# Patient Record
Sex: FEMALE | Race: WHITE | NOT HISPANIC OR LATINO | Employment: UNEMPLOYED | URBAN - METROPOLITAN AREA
[De-identification: names, ages, dates, MRNs, and addresses within clinical notes are randomized per-mention and may not be internally consistent; named-entity substitution may affect disease eponyms.]

---

## 2017-08-14 ENCOUNTER — OFFICE VISIT (OUTPATIENT)
Dept: OBSTETRICS AND GYNECOLOGY | Facility: CLINIC | Age: 52
End: 2017-08-14
Payer: MEDICAID

## 2017-08-14 ENCOUNTER — TELEPHONE (OUTPATIENT)
Dept: OBSTETRICS AND GYNECOLOGY | Facility: CLINIC | Age: 52
End: 2017-08-14

## 2017-08-14 VITALS
HEART RATE: 82 BPM | DIASTOLIC BLOOD PRESSURE: 90 MMHG | WEIGHT: 208 LBS | RESPIRATION RATE: 18 BRPM | SYSTOLIC BLOOD PRESSURE: 180 MMHG | HEIGHT: 62 IN | BODY MASS INDEX: 38.28 KG/M2

## 2017-08-14 DIAGNOSIS — Z01.419 WELL WOMAN EXAM WITH ROUTINE GYNECOLOGICAL EXAM: Primary | ICD-10-CM

## 2017-08-14 DIAGNOSIS — N95.1 MENOPAUSAL SYMPTOMS: ICD-10-CM

## 2017-08-14 DIAGNOSIS — K60.2 RECTAL FISSURE: ICD-10-CM

## 2017-08-14 DIAGNOSIS — Z12.4 CERVICAL CANCER SCREENING: ICD-10-CM

## 2017-08-14 PROCEDURE — 99213 OFFICE O/P EST LOW 20 MIN: CPT | Mod: PBBFAC | Performed by: OBSTETRICS & GYNECOLOGY

## 2017-08-14 PROCEDURE — 99386 PREV VISIT NEW AGE 40-64: CPT | Mod: S$PBB,,, | Performed by: OBSTETRICS & GYNECOLOGY

## 2017-08-14 PROCEDURE — 99999 PR PBB SHADOW E&M-EST. PATIENT-LVL III: CPT | Mod: PBBFAC,,, | Performed by: OBSTETRICS & GYNECOLOGY

## 2017-08-14 RX ORDER — HYDROCORTISONE ACETATE 25 MG/1
25 SUPPOSITORY RECTAL 2 TIMES DAILY PRN
Qty: 14 SUPPOSITORY | Refills: 1 | Status: SHIPPED | OUTPATIENT
Start: 2017-08-14 | End: 2017-08-21

## 2017-08-14 RX ORDER — ACETAMINOPHEN AND CODEINE PHOSPHATE 300; 30 MG/1; MG/1
1 TABLET ORAL EVERY 4 HOURS PRN
Qty: 20 TABLET | Refills: 0 | Status: SHIPPED | OUTPATIENT
Start: 2017-08-14 | End: 2019-01-07

## 2017-08-14 RX ORDER — CLONAZEPAM 0.5 MG/1
0.5 TABLET ORAL DAILY PRN
Refills: 1 | COMMUNITY
Start: 2017-07-11 | End: 2019-11-18

## 2017-08-14 NOTE — TELEPHONE ENCOUNTER
Pt scheduled with Dr. Lawrence 8/23/17 3:15pm. Referral faxed to [phone], confirmation received.

## 2017-08-14 NOTE — PROGRESS NOTES
Subjective:    Patient ID: Yaneth Tejada is a 52 y.o. female.     Chief Complaint: Annual Well Woman Exam     History of Present Illness:  Yaneth presents today for Annual Well Woman exam. .No LMP recorded. Patient has had a hysterectomy.. She is currently using Oral Estrogen and she reports no problems with hot flashes, night sweats, irritability and vaginal dryness. She denies breast tenderness, masses, nipple discharge.  She reports no problems with urination. Bowel movements have been painful recently with spotting blood..    Menstrual History:   No LMP recorded. Patient has had a hysterectomy.    OB History      Para Term  AB Living    2 2 2          SAB TAB Ectopic Multiple Live Births                       Review of Systems   Constitutional: Negative for activity change, appetite change, chills, diaphoresis, fatigue, fever and unexpected weight change.   HENT: Negative for mouth sores and tinnitus.    Eyes: Negative for discharge and visual disturbance.   Respiratory: Negative for cough, shortness of breath and wheezing.    Cardiovascular: Negative for chest pain, palpitations and leg swelling.   Gastrointestinal: Positive for blood in stool. Negative for abdominal pain, constipation, diarrhea, nausea and vomiting.   Endocrine: Positive for hot flashes. Negative for diabetes, hair loss, hyperthyroidism and hypothyroidism.   Genitourinary: Negative for decreased libido, dyspareunia, dysuria, flank pain, frequency, genital sores, hematuria, menorrhagia, menstrual problem, pelvic pain, urgency, vaginal bleeding, vaginal discharge, vaginal pain, urinary incontinence, postcoital bleeding and vaginal odor.   Musculoskeletal: Negative for back pain, joint swelling and myalgias.   Skin:  Negative for rash, no acne and hair changes.   Neurological: Negative for seizures, syncope, numbness and headaches.   Hematological: Negative for adenopathy. Does not bruise/bleed easily.   Psychiatric/Behavioral: Negative  for sleep disturbance. The patient is not nervous/anxious.    Breast: Negative for breast pain and nipple discharge        Objective:    Vital Signs:  Vitals:    08/14/17 1339   BP: (!) 180/90   Pulse: 82   Resp: 18       Physical Exam:  General:  alert,normal appearing gravid female   Skin:  Skin color, texture, turgor normal. No rashes or lesions   HEENT:  conjunctivae/corneas clear. PERRL.   Neck: supple, trachea midline, no adenopathy or thyromegally   Respiratory:  clear to auscultation bilaterally   Heart:  regular rate and rhythm, S1, S2 normal, no murmur, click, rub or gallop   Breasts:   Nipples are protruding and have no nipple discharge. No palpable masses, erythema, skin changes, tenderness, or adenopathy.   Abdomen:  soft, non-tender. Bowel sounds normal. No masses,  no organomegaly   Pelvis: External genitalia: normal general appearance  Urinary system: urethral meatus normal, bladder nontender  Vaginal: normal mucosa without prolapse or lesions  Cervix: removed surgically  Uterus: removed surgically  Adnexa: removed surgically   Extremities: Normal ROM; no edema, no cyanosis   Neurologial: Normal strength and tone. No focal numbness or weakness. Reflexes 2+ and equal.   Psychiatric: normal mood, speech, dress, and thought processes         Assessment:      1. Well woman exam with routine gynecological exam    2. Menopausal symptoms    3. Rectal fissure    4. Cervical cancer screening          Plan:      Well woman exam with routine gynecological exam    Menopausal symptoms  -     estrogens, conjugated, (PREMARIN) 0.9 MG Tab; Take 1 tablet (0.9 mg total) by mouth once daily.  Dispense: 30 tablet; Refill: 11    Rectal fissure  -     hydrocortisone (ANUSOL-HC) 25 mg suppository; Place 1 suppository (25 mg total) rectally 2 (two) times daily as needed for Hemorrhoids.  Dispense: 14 suppository; Refill: 1  -     acetaminophen-codeine 300-30mg (TYLENOL-CODEINE #3) 300-30 mg Tab; Take 1 tablet by mouth every  4 (four) hours as needed.  Dispense: 20 tablet; Refill: 0  -     Ambulatory referral to General Surgery    Cervical cancer screening  -     Liquid-based pap smear, diagnostic        COUNSELING:  Yaneth was counseled on A.C.O.G. Pap guidelines and recommendations for yearly pelvic exams in addition to recommendations for yearly mammograms and monthly self breast exams. In addition she was counseled on adequate intake of calcium and vitamin D; to see her PCP for other health maintenance.

## 2017-08-15 ENCOUNTER — TELEPHONE (OUTPATIENT)
Dept: OBSTETRICS AND GYNECOLOGY | Facility: CLINIC | Age: 52
End: 2017-08-15

## 2017-08-15 RX ORDER — HYDROCORTISONE 25 MG/G
CREAM TOPICAL 2 TIMES DAILY
Qty: 30 G | Refills: 2 | Status: SHIPPED | OUTPATIENT
Start: 2017-08-15 | End: 2017-08-15

## 2017-08-15 NOTE — TELEPHONE ENCOUNTER
Inform patient that Proctosol Rx sent to [facility] pharmacy. Use of cream with pain pill will help better that pain pill alone. If pain not better, she might check with [facility] ED to see if that could get her quicker access to Dr. Wiseman. I she is unable to see him until 8/23, I can possibly arrange her to see someone else before that. Let me know.

## 2017-08-15 NOTE — TELEPHONE ENCOUNTER
----- Message from Aaron Crump MA sent at 8/15/2017  9:20 AM CDT -----  Contact: Patient  Has question regarding Rx that was prescribed yesterday. States she is in a lot of pain and the Rx in not working and making her feel ill. Please return call @ [phone]    Pharmacy: [facility]

## 2017-08-15 NOTE — TELEPHONE ENCOUNTER
----- Message from Aaron Crump MA sent at 8/15/2017  9:20 AM CDT -----  Contact: Patient  Has question regarding Rx that was prescribed yesterday. States she is in a lot of pain and the Rx in not working and making her feel ill. Please return call @ [phone]    Pharmacy: [facility]

## 2017-08-15 NOTE — TELEPHONE ENCOUNTER
Patient stated tylenol #3 is not helping with the pain. She would like something else called in for pain. Her appointment with Dr Wiseman is 8/23. Anusol-hc suppository's is not covered by her insurance. Proctosol HC cream 2.5% is covered or Hemorrhoidal 51% suppository. Please send to pharm.

## 2017-08-18 ENCOUNTER — TELEPHONE (OUTPATIENT)
Dept: OBSTETRICS AND GYNECOLOGY | Facility: CLINIC | Age: 52
End: 2017-08-18

## 2017-08-22 ENCOUNTER — TELEPHONE (OUTPATIENT)
Dept: OBSTETRICS AND GYNECOLOGY | Facility: CLINIC | Age: 52
End: 2017-08-22

## 2017-08-22 NOTE — TELEPHONE ENCOUNTER
----- Message from Jenise Meraz sent at 8/22/2017 11:02 AM CDT -----  Contact: juancarlos Tejada  MRN: [id number]  Home Phone      [phone]  Work Phone      Not on file.  Mobile          [phone]    Patient Care Team:  Cash Dempsey MD as PCP - General (Family Medicine)  Carrington Us MD as Obstetrician (Obstetrics)  OB? No  What phone number can you be reached at? [phone]  Message: insurance is not covering the script that Dr Us is calling in. Patient stated it is $200.00/ she said she called yesterday and they just keep calling in the exact same script. Patient is requesting something cheaper.

## 2017-08-22 NOTE — TELEPHONE ENCOUNTER
Patient states copay for Anusol is $200. States the will discuss treatment with Dr. Wiseman tomorrow for evaluation.

## 2017-08-24 ENCOUNTER — TELEPHONE (OUTPATIENT)
Dept: OBSTETRICS AND GYNECOLOGY | Facility: CLINIC | Age: 52
End: 2017-08-24

## 2017-08-24 NOTE — TELEPHONE ENCOUNTER
Pt contact office to inform MD she is scheduled for surgery with Dr. Wiseman 8/28/17. Will contact office if anything further needed.

## 2017-08-24 NOTE — TELEPHONE ENCOUNTER
----- Message from Wendy Carlin MA sent at 8/24/2017  2:56 PM CDT -----  Contact: SID Tejada  MRN: [id number]  Home Phone      [phone]  Work Phone      Not on file.  Mobile          [phone]    Patient Care Team:  Cash Dempsey MD as PCP - General (Family Medicine)  Carrington Us MD as Obstetrician (Obstetrics)  OB? No  What phone number can you be reached at? [phone]  Message:   Would like Dr Us know that she is having surgery on Monday with Dr Wiseman.

## 2017-08-24 NOTE — TELEPHONE ENCOUNTER
----- Message from Jenise Meraz sent at 8/24/2017  1:41 PM CDT -----  Contact: juancarlos Tejada  MRN: [id number]  Home Phone      [phone]  Work Phone      Not on file.  Mobile          [phone]    Patient Care Team:  Cash Dempsey MD as PCP - General (Family Medicine)  Carrington Us MD as Obstetrician (Obstetrics)  OB? No  What phone number can you be reached at? [phone]  Message: pt wanted to let Dr Us know she is having surgery Monday @ 7am

## 2018-04-16 ENCOUNTER — TELEPHONE (OUTPATIENT)
Dept: OBSTETRICS AND GYNECOLOGY | Facility: CLINIC | Age: 53
End: 2018-04-16

## 2018-04-16 DIAGNOSIS — Z12.31 ENCOUNTER FOR SCREENING MAMMOGRAM FOR BREAST CANCER: Primary | ICD-10-CM

## 2018-04-16 NOTE — TELEPHONE ENCOUNTER
----- Message from Vangie Kirk sent at 2018  2:45 PM CDT -----  Contact: SID Tejada  MRN: [id number]  : 1965  PCP: Cash Dempsey  Home Phone      [phone]  Work Phone      Not on file.  [employer]          [phone]      MESSAGE: Please place orders for mammogram that is scheduled for 18 at [facility].  Phone:[phone]

## 2018-04-23 ENCOUNTER — HOSPITAL ENCOUNTER (OUTPATIENT)
Dept: RADIOLOGY | Facility: HOSPITAL | Age: 53
Discharge: HOME | End: 2018-04-23
Attending: OBSTETRICS & GYNECOLOGY
Payer: MEDICAID

## 2018-04-23 VITALS — WEIGHT: 208 LBS | BODY MASS INDEX: 38.28 KG/M2 | HEIGHT: 62 IN

## 2018-04-23 DIAGNOSIS — Z12.31 ENCOUNTER FOR SCREENING MAMMOGRAM FOR BREAST CANCER: ICD-10-CM

## 2018-04-23 PROCEDURE — 77063 BREAST TOMOSYNTHESIS BI: CPT | Mod: 26,,, | Performed by: RADIOLOGY

## 2018-04-23 PROCEDURE — 77067 SCR MAMMO BI INCL CAD: CPT | Mod: TC

## 2018-04-23 PROCEDURE — 77067 SCR MAMMO BI INCL CAD: CPT | Mod: 26,,, | Performed by: RADIOLOGY

## 2018-06-15 RX ORDER — CLOTRIMAZOLE AND BETAMETHASONE DIPROPIONATE 10; .64 MG/G; MG/G
CREAM TOPICAL 2 TIMES DAILY
Qty: 15 G | Refills: 1 | Status: SHIPPED | OUTPATIENT
Start: 2018-06-15 | End: 2021-10-04 | Stop reason: SDUPTHER

## 2018-06-15 RX ORDER — TERCONAZOLE 8 MG/G
1 CREAM VAGINAL NIGHTLY
Qty: 20 G | Refills: 1 | Status: SHIPPED | OUTPATIENT
Start: 2018-06-15 | End: 2018-06-18

## 2019-01-07 ENCOUNTER — PROCEDURE VISIT (OUTPATIENT)
Dept: OBSTETRICS AND GYNECOLOGY | Facility: CLINIC | Age: 54
End: 2019-01-07
Payer: MEDICAID

## 2019-01-07 ENCOUNTER — OFFICE VISIT (OUTPATIENT)
Dept: OBSTETRICS AND GYNECOLOGY | Facility: CLINIC | Age: 54
End: 2019-01-07
Payer: MEDICAID

## 2019-01-07 VITALS
HEIGHT: 62 IN | DIASTOLIC BLOOD PRESSURE: 96 MMHG | HEART RATE: 86 BPM | WEIGHT: 230.63 LBS | RESPIRATION RATE: 18 BRPM | BODY MASS INDEX: 42.44 KG/M2 | SYSTOLIC BLOOD PRESSURE: 150 MMHG

## 2019-01-07 DIAGNOSIS — R10.30 LOWER ABDOMINAL PAIN: Primary | ICD-10-CM

## 2019-01-07 DIAGNOSIS — R10.2 PELVIC PAIN IN FEMALE: ICD-10-CM

## 2019-01-07 DIAGNOSIS — R10.84 GENERALIZED ABDOMINAL PAIN: ICD-10-CM

## 2019-01-07 PROCEDURE — 76830 TRANSVAGINAL US NON-OB: CPT | Mod: PBBFAC | Performed by: OBSTETRICS & GYNECOLOGY

## 2019-01-07 PROCEDURE — 76830 TRANSVAGINAL US NON-OB: CPT | Mod: 26,S$PBB,, | Performed by: OBSTETRICS & GYNECOLOGY

## 2019-01-07 PROCEDURE — 99999 PR PBB SHADOW E&M-EST. PATIENT-LVL III: CPT | Mod: PBBFAC,,, | Performed by: OBSTETRICS & GYNECOLOGY

## 2019-01-07 PROCEDURE — 99999 PR PBB SHADOW E&M-EST. PATIENT-LVL III: ICD-10-PCS | Mod: PBBFAC,,, | Performed by: OBSTETRICS & GYNECOLOGY

## 2019-01-07 PROCEDURE — 99214 PR OFFICE/OUTPT VISIT, EST, LEVL IV, 30-39 MIN: ICD-10-PCS | Mod: 25,S$PBB,, | Performed by: OBSTETRICS & GYNECOLOGY

## 2019-01-07 PROCEDURE — 99214 OFFICE O/P EST MOD 30 MIN: CPT | Mod: 25,S$PBB,, | Performed by: OBSTETRICS & GYNECOLOGY

## 2019-01-07 PROCEDURE — 76830 PR  ECHOGRAPHY,TRANSVAGINAL: ICD-10-PCS | Mod: 26,S$PBB,, | Performed by: OBSTETRICS & GYNECOLOGY

## 2019-01-07 PROCEDURE — 99213 OFFICE O/P EST LOW 20 MIN: CPT | Mod: PBBFAC | Performed by: OBSTETRICS & GYNECOLOGY

## 2019-01-07 RX ORDER — HYDROCODONE BITARTRATE AND ACETAMINOPHEN 5; 325 MG/1; MG/1
1 TABLET ORAL EVERY 6 HOURS PRN
Qty: 20 TABLET | Refills: 0 | Status: SHIPPED | OUTPATIENT
Start: 2019-01-07 | End: 2019-01-17

## 2019-01-07 RX ORDER — LOSARTAN POTASSIUM AND HYDROCHLOROTHIAZIDE 12.5; 1 MG/1; MG/1
1 TABLET ORAL DAILY
Refills: 5 | COMMUNITY
Start: 2018-11-28 | End: 2021-10-04

## 2019-01-07 RX ORDER — FLUOXETINE HYDROCHLORIDE 20 MG/1
20 CAPSULE ORAL DAILY
Refills: 5 | COMMUNITY
Start: 2018-11-02 | End: 2019-11-18

## 2019-01-07 NOTE — PROGRESS NOTES
Subjective:    Patient ID: Yaneth Tejada is a 53 y.o. y.o. female.     Chief Complaint:   Chief Complaint   Patient presents with    Pelvic Pain       History of Present Illness:  Yaneth presents today for evaluation of lower abdominal pain. She has been having discomfort in her lower abdomen over the past 3 months. She states pain is bilateral and worse when standing. She denies fever, chills, dysuria. She reports mild discomfort with bowel movements. She denies hematuria, hematochezia, melena.      Menstrual History:   No LMP recorded. Patient has had a hysterectomy..     OB History      Para Term  AB Living    2 2 2          SAB TAB Ectopic Multiple Live Births                         Review of Systems   Constitutional: Negative for activity change, appetite change, chills, diaphoresis, fatigue, fever and unexpected weight change.   HENT: Negative for mouth sores and tinnitus.    Eyes: Negative for discharge and visual disturbance.   Respiratory: Negative for cough, shortness of breath and wheezing.    Cardiovascular: Negative for chest pain, palpitations and leg swelling.   Gastrointestinal: Positive for abdominal pain. Negative for blood in stool, constipation, diarrhea, nausea and vomiting.   Endocrine: Negative for diabetes, hair loss, hot flashes, hyperthyroidism and hypothyroidism.   Genitourinary: Negative for decreased libido, dyspareunia, dysuria, flank pain, frequency, genital sores, hematuria, menorrhagia, menstrual problem, pelvic pain, urgency, vaginal bleeding, vaginal discharge, vaginal pain, urinary incontinence, postcoital bleeding and vaginal odor.   Musculoskeletal: Negative for back pain, joint swelling and myalgias.   Neurological: Negative for seizures, syncope, numbness and headaches.   Hematological: Negative for adenopathy. Does not bruise/bleed easily.   Psychiatric/Behavioral: Negative for sleep disturbance. The patient is not nervous/anxious.    Breast: Negative for  mastodynia and nipple discharge        Objective:    Vital Signs:  Vitals:    01/07/19 1257   BP: (!) 150/96   Pulse: 86   Resp: 18       Physical Exam:  General:  alert,normal appearing gravid female   Skin:  Skin color, texture, turgor normal. No rashes or lesions   HEENT:  conjunctivae/corneas clear. PERRL.   Neck: supple, trachea midline, no adenopathy or thyromegally   Respiratory:  clear to auscultation bilaterally   Heart:  regular rate and rhythm, S1, S2 normal, no murmur, click, rub or gallop   Breasts:   Nipples are protruding and have no nipple discharge. No palpable masses, erythema, skin changes, tenderness, or adenopathy.   Abdomen:  Tender lower abdomen. No guarding or rebound. No palpable masses. BS normal.   Pelvis: External genitalia: normal general appearance  Urinary system: urethral meatus normal, bladder nontender  Vaginal: normal mucosa without prolapse or lesions  Cervix: removed surgically  Uterus: removed surgically  Adnexa: non palpable, Non-tender   Extremities: Normal ROM; no edema, no cyanosis   Neurologial: Normal strength and tone. No focal numbness or weakness. Reflexes 2+ and equal.   Psychiatric: normal mood, speech, dress, and thought processes         Assessment:      1. Lower abdominal pain          Plan:      Lower abdominal pain  -     US OB/GYN Procedure ([facility]); Future; Expected date: 01/07/2019

## 2019-01-08 ENCOUNTER — HOSPITAL ENCOUNTER (OUTPATIENT)
Dept: RADIOLOGY | Facility: HOSPITAL | Age: 54
Discharge: HOME | End: 2019-01-08
Attending: OBSTETRICS & GYNECOLOGY
Payer: MEDICAID

## 2019-01-08 DIAGNOSIS — R10.30 LOWER ABDOMINAL PAIN: ICD-10-CM

## 2019-01-08 PROCEDURE — 74177 CT ABD & PELVIS W/CONTRAST: CPT | Mod: 26,,, | Performed by: RADIOLOGY

## 2019-01-08 PROCEDURE — 74177 CT ABDOMEN PELVIS WITH CONTRAST: ICD-10-PCS | Mod: 26,,, | Performed by: RADIOLOGY

## 2019-01-08 PROCEDURE — 74177 CT ABD & PELVIS W/CONTRAST: CPT | Mod: TC

## 2019-01-08 PROCEDURE — 25500020 PHARM REV CODE 255: Performed by: OBSTETRICS & GYNECOLOGY

## 2019-01-08 RX ADMIN — IOHEXOL 30 ML: 350 INJECTION, SOLUTION INTRAVENOUS at 11:01

## 2019-01-08 RX ADMIN — IOHEXOL 75 ML: 350 INJECTION, SOLUTION INTRAVENOUS at 11:01

## 2019-01-09 DIAGNOSIS — R10.30 ABDOMINAL PAIN, LOWER: Primary | ICD-10-CM

## 2019-01-14 ENCOUNTER — TELEPHONE (OUTPATIENT)
Dept: OBSTETRICS AND GYNECOLOGY | Facility: CLINIC | Age: 54
End: 2019-01-14

## 2019-01-14 NOTE — TELEPHONE ENCOUNTER
----- Message from Wendy Carlin MA sent at 1/14/2019  1:50 PM CST -----  Contact: Mikayla melchor/JAAD Tejada  MRN: [id number]  Home Phone      [phone]  Work Phone      Not on file.  Mobile          [phone]    Patient Care Team:  Cash Dempsey MD as PCP - General (Family Medicine)  Carrington Us MD as Obstetrician (Obstetrics)  OB? NO  What phone number can you be reached at?  [phone]  Message:  Needs orders for lab work.  Stated patient is there now.

## 2019-01-18 ENCOUNTER — TELEPHONE (OUTPATIENT)
Dept: OBSTETRICS AND GYNECOLOGY | Facility: CLINIC | Age: 54
End: 2019-01-18

## 2019-01-18 NOTE — TELEPHONE ENCOUNTER
Spoke with patient, states got in touch with Dr. Us last pm and he discussed results with her. Sent in OCP to pharmacy. Pt states per Dr. Us, needs to schedule f/u appointment. Appt scheduled. Bloodwork scanned to chart.

## 2019-01-18 NOTE — TELEPHONE ENCOUNTER
Attempted to contact patient with results of bloodwork per Dr. Us. Her his v/o pt is not menopausal, he would like to discuss possibly starting ocp. He would like a good number he can reach her at and will call her Monday to discuss.

## 2019-02-04 ENCOUNTER — TELEPHONE (OUTPATIENT)
Dept: OBSTETRICS AND GYNECOLOGY | Facility: CLINIC | Age: 54
End: 2019-02-04

## 2019-02-04 ENCOUNTER — OFFICE VISIT (OUTPATIENT)
Dept: OBSTETRICS AND GYNECOLOGY | Facility: CLINIC | Age: 54
End: 2019-02-04
Payer: MEDICAID

## 2019-02-04 VITALS
RESPIRATION RATE: 18 BRPM | WEIGHT: 232 LBS | DIASTOLIC BLOOD PRESSURE: 94 MMHG | BODY MASS INDEX: 42.69 KG/M2 | SYSTOLIC BLOOD PRESSURE: 154 MMHG | HEIGHT: 62 IN | HEART RATE: 82 BPM

## 2019-02-04 DIAGNOSIS — R10.2 PELVIC PAIN IN FEMALE: Primary | ICD-10-CM

## 2019-02-04 PROCEDURE — 99999 PR PBB SHADOW E&M-EST. PATIENT-LVL III: ICD-10-PCS | Mod: PBBFAC,,, | Performed by: OBSTETRICS & GYNECOLOGY

## 2019-02-04 PROCEDURE — 99214 OFFICE O/P EST MOD 30 MIN: CPT | Mod: S$PBB,,, | Performed by: OBSTETRICS & GYNECOLOGY

## 2019-02-04 PROCEDURE — 99214 PR OFFICE/OUTPT VISIT, EST, LEVL IV, 30-39 MIN: ICD-10-PCS | Mod: S$PBB,,, | Performed by: OBSTETRICS & GYNECOLOGY

## 2019-02-04 PROCEDURE — 99213 OFFICE O/P EST LOW 20 MIN: CPT | Mod: PBBFAC | Performed by: OBSTETRICS & GYNECOLOGY

## 2019-02-04 PROCEDURE — 99999 PR PBB SHADOW E&M-EST. PATIENT-LVL III: CPT | Mod: PBBFAC,,, | Performed by: OBSTETRICS & GYNECOLOGY

## 2019-02-04 RX ORDER — HYDROCODONE BITARTRATE AND ACETAMINOPHEN 5; 325 MG/1; MG/1
1 TABLET ORAL EVERY 6 HOURS PRN
Qty: 20 TABLET | Refills: 0 | Status: SHIPPED | OUTPATIENT
Start: 2019-02-04 | End: 2019-02-14

## 2019-02-04 NOTE — TELEPHONE ENCOUNTER
Lap BSO scheduled for 2/18/19 in pt's presence, consents signed for surgery and provided to patient.  Appt scheduled for preadmit for 2/13/19, pt instructed to bring consents to this appt.  Michaela in surgery notified of date and time.

## 2019-02-04 NOTE — PROGRESS NOTES
Subjective:    Patient ID: Yaneth Tejada is a 54 y.o. y.o. female.     Chief Complaint:   Chief Complaint   Patient presents with    Follow-up     pelvic pain       History of Present Illness:  Yaneth presents today for follow-up of pelvis pain. She has been having pelvic pain for the past several months. She was seen last week and U/S was done revealing normal appearing ovaries. She was started on OC's but has not had any relief of pain on OC's. She denies N/V, fever, Chills, dysuria, hematuria, changes in bowel movements, pain with bowel movements..      Menstrual History:   No LMP recorded. Patient has had a hysterectomy..     OB History      Para Term  AB Living    2 2 2          SAB TAB Ectopic Multiple Live Births                         Review of Systems   Constitutional: Negative for activity change, appetite change, chills, diaphoresis, fatigue, fever and unexpected weight change.   HENT: Negative for mouth sores and tinnitus.    Eyes: Negative for discharge and visual disturbance.   Respiratory: Negative for cough, shortness of breath and wheezing.    Cardiovascular: Negative for chest pain, palpitations and leg swelling.   Gastrointestinal: Positive for abdominal pain. Negative for blood in stool, constipation, diarrhea, nausea and vomiting.   Endocrine: Negative for diabetes, hair loss, hot flashes, hyperthyroidism and hypothyroidism.   Genitourinary: Positive for pelvic pain. Negative for decreased libido, dyspareunia, dysuria, flank pain, frequency, genital sores, hematuria, menorrhagia, menstrual problem, urgency, vaginal bleeding, vaginal discharge, vaginal pain, urinary incontinence, postcoital bleeding and vaginal odor.   Musculoskeletal: Negative for back pain, joint swelling and myalgias.   Neurological: Negative for seizures, syncope, numbness and headaches.   Hematological: Negative for adenopathy. Does not bruise/bleed easily.   Psychiatric/Behavioral: Negative for sleep disturbance.  The patient is not nervous/anxious.    Breast: Negative for mastodynia and nipple discharge        Objective:    Vital Signs:  Vitals:    02/04/19 1348   BP: (!) 154/94   Pulse: 82   Resp: 18       Physical Exam:  General:  alert,normal appearing gravid female   Skin:  Skin color, texture, turgor normal. No rashes or lesions   HEENT:  conjunctivae/corneas clear. PERRL.   Neck: supple, trachea midline, no adenopathy or thyromegally   Respiratory:  clear to auscultation bilaterally   Heart:  regular rate and rhythm, S1, S2 normal, no murmur, click, rub or gallop   Breasts:  Nipples are protruding and have no nipple discharge. No palpable masses, erythema, skin changes, tenderness, or adenopathy.   Abdomen:  soft, non-tender. Bowel sounds normal. No masses,  no organomegaly   Pelvis: External genitalia: normal general appearance  Urinary system: urethral meatus normal, bladder nontender  Vaginal: normal mucosa without prolapse or lesions  Cervix: normal appearance  Uterus: normal single, nontender  Adnexa: tenderness, no palpable masses.   Extremities: Normal ROM; no edema, no cyanosis   Neurologial: Normal strength and tone. No focal numbness or weakness. Reflexes 2+ and equal.   Psychiatric: normal mood, speech, dress, and thought processes         Assessment:      1. Pelvic pain in female          Plan:      Pelvic pain in female      She has not improved on OC's. I will laparoscope and consider BSO.

## 2019-02-05 RX ORDER — SODIUM CHLORIDE, SODIUM LACTATE, POTASSIUM CHLORIDE, CALCIUM CHLORIDE 600; 310; 30; 20 MG/100ML; MG/100ML; MG/100ML; MG/100ML
INJECTION, SOLUTION INTRAVENOUS CONTINUOUS
Status: CANCELLED | OUTPATIENT
Start: 2019-02-05

## 2019-02-13 ENCOUNTER — HOSPITAL ENCOUNTER (OUTPATIENT)
Dept: RADIOLOGY | Facility: HOSPITAL | Age: 54
Discharge: HOME | End: 2019-02-13
Attending: OBSTETRICS & GYNECOLOGY
Payer: MEDICAID

## 2019-02-13 ENCOUNTER — HOSPITAL ENCOUNTER (OUTPATIENT)
Dept: PREADMISSION TESTING | Facility: HOSPITAL | Age: 54
Discharge: HOME | End: 2019-02-13
Attending: OBSTETRICS & GYNECOLOGY
Payer: MEDICAID

## 2019-02-13 ENCOUNTER — ANESTHESIA EVENT (OUTPATIENT)
Dept: SURGERY | Facility: HOSPITAL | Age: 54
End: 2019-02-13
Payer: MEDICAID

## 2019-02-13 ENCOUNTER — HOSPITAL ENCOUNTER (OUTPATIENT)
Dept: PULMONOLOGY | Facility: HOSPITAL | Age: 54
Discharge: HOME | End: 2019-02-13
Attending: OBSTETRICS & GYNECOLOGY
Payer: MEDICAID

## 2019-02-13 VITALS — BODY MASS INDEX: 42.68 KG/M2 | HEIGHT: 62 IN | WEIGHT: 231.94 LBS

## 2019-02-13 DIAGNOSIS — R10.2 PELVIC PAIN IN FEMALE: ICD-10-CM

## 2019-02-13 PROCEDURE — 71045 X-RAY EXAM CHEST 1 VIEW: CPT | Mod: 26,,, | Performed by: RADIOLOGY

## 2019-02-13 PROCEDURE — 93010 ELECTROCARDIOGRAM REPORT: CPT | Mod: ,,, | Performed by: INTERNAL MEDICINE

## 2019-02-13 PROCEDURE — 93005 ELECTROCARDIOGRAM TRACING: CPT

## 2019-02-13 PROCEDURE — 71045 XR CHEST 1 VIEW PRE-OP: ICD-10-PCS | Mod: 26,,, | Performed by: RADIOLOGY

## 2019-02-13 PROCEDURE — 93010 EKG 12-LEAD: ICD-10-PCS | Mod: ,,, | Performed by: INTERNAL MEDICINE

## 2019-02-13 PROCEDURE — 71045 X-RAY EXAM CHEST 1 VIEW: CPT | Mod: TC

## 2019-02-13 NOTE — DISCHARGE INSTRUCTIONS
[facility]  Pre Admit Instructions    Day and Date of Procedure:  Monday 2/18/19      · Call your doctor if you become ill before your surgery  · Someone will call you between 1 p.m. And 5 p.m.the workday before the procedure to give you an arrival time       - 7 a.m. To 5 p.m. Enter through Patient Registration Main Lobby  · You must have a responsible  to bring you home    Do NOT eat or drink anything   past midnight before your procedure day    Please    · Do not wear makeup, jewelry, nail polish or body piercings  · Bring containers/solution for contacts, dentures, bridges - these and hearing aids will be removed before your procedure  · Do not bring cash, jewelry or valuables the day of your procedure   · No smoking at least 24 hours before your procedure  · Wear clothing that is comfortable and easy to take off and put on  · Do NOT shave for at least 5 days before your surgery    Review skin preparation handout before using. Shower with Hibiclens the Night before the procedure. Bring remaining Hibiclens with you the morning of surgery.                Information about your stay (Please Review)    Before Surgery  1. Cafeteria Meals: 7am to 10am; 11am to 1:30 pm; Dinner/Supper must may be ordered between 11:00 am and 4 pm from the [facility] [employer] Menu. Food will be available to  between 5 pm and 6 pm. The kitchen phone extension is [phone].  2. Your doctor may order and review labs, x-rays, ECG or other tests as a pre-surgery workup and will call you if there is need for follow up.  3. No smoking inside or outside the hospital on hospital grounds.  4. Wear clothing that is easy to take off and put on.  The hospital will provide you with a gown.  5. You may bring robe, slippers, nightwear, and toiletries (toothbrush, toothpaste, makeup).  6. If your doctor orders a Fleets Enema or other prep, follow package and/or doctors orders.  7. Brush your teeth and rinse your mouth the morning  of surgery, but dont swallow the water.  8. The nurse will ask questions and check your condition.  The doctor may nicho your surgical site.  9. Compression boots may be put on your calves to reduce the risk of blood clots.  10. The doctor may order medicine to help you relax before surgery.  After Surgery  1. The nurse will check your temperature, breathing, blood pressure, heart rate, IV site, and surgery site.  2. A diet will be ordered-most start with ice chips and then advance slowly to other foods.  3. If you have IV fluids the IV pump will beep to let the nurse know that she needs to check it.  4. You may have a urinary catheter and staff may measure your oral intake and urine output.  5. Pain medication may be ordered by the doctor after surgery.  If you have a pain management device tell your caretakers not to press the button because of OVERDOSE RISK.  6. When the nurse or doctor tells you it is okay to get out of bed, ask for help until you are stable.  7. The nurse may ask you to turn, cough, and deep breathe to prevent lung problems.  You can use a pillow to hold your incision when you deep breathe or cough to reduce pain.  8. The nurse will give you discharge instructions--incision care, symptoms to report to your doctor, and your follow-up appointment when you are discharged.  You cannot drive yourself home.  Goal for Discharge from One Day Surgery  · Control pain with an oral medication  · Walk without feeling dizzy or weak  · Tolerate liquids well  · Urinate without difficulty    Things you can do to  Reduce the Risk of Infections or Complications  Wash Hands and use Waterless Hand Sanitizers  · Wash hands frequently with soap and warm water for at least 15 seconds.   · Use hand sanitizers (alcohol based) often at home and in public if hands are not visibly soiled  Take Antibiotic Exactly as Prescribed  · Do not stop antibiotics too soon; you risk developing infection resistant to  antibiotics  · Take your antibiotic even if you are feeling better and even if they upset your stomach  · Call the doctor if you cant tolerate the antibiotic or you have an allergic reaction  Stay Healthy  · Take medicines as prescribed by your doctor  · Keep your diabetes under control - diet and medication  · Get enough rest, exercise and eat a healthy diet  Keep the Wound Clean and Dry  · Wash hands before and after taking care of the incision (cut)  · Wash hands when you remove a dressing, before you touch/apply a new dressing  · Shower and clean incision with antibacterial soap and rinse well if the doctor approves  · Allow the cut to dry completely before putting on a clean dressing  · Do not touch the part of the bandage that will cover the incision  · Do not use ointments unless your doctor tells you to-can promote bacterial growth  · If ordered, put ointment directly on the dressing-do not touch the end of the tube  · Do not scrub, remove scabs, or leave a damp dressing on the incision  · Do not use peroxide or alcohol to clean the incision unless the doctor tells you to   · Do not let children, pets or anyone else contaminate the incision  Stop Smoking To Prevent Infection  · Stop smoking-Centers for Disease Control recommends 30 days before surgery  · Smokers get more infections after surgery-studies have shown 6 times the risk  · Smokers have more scarring and heal slower-open wounds get infected easier  Prevent Respiratory complications  · Stop smoking  · Turn, cough, and deep breathe even if you have some pain when you do so.  · Splint your incision with a pillow when you cough/deep breath, to help control pain.  · Do not lie in one position for long periods of time.   Prevent Blood Clots  · When you wake move your legs, flex your feet, rotate your ankles, wiggle your toes  · Get up when the doctor says its ok.  Dangle your feet from the side of the bed  · Report symptoms-leg pain, redness/swelling,  warm to touch; fever; shortness of breath, chest pain, severe upper back pain.

## 2019-02-13 NOTE — ANESTHESIA PREPROCEDURE EVALUATION
02/13/2019  Yaneth Tejada is a 54 y.o., female.    Anesthesia Evaluation    I have reviewed the Patient Summary Reports.    I have reviewed the Nursing Notes.   I have reviewed the Medications.     Review of Systems  Anesthesia Hx:  No problems with previous Anesthesia  History of prior surgery of interest to airway management or planning:  Denies Personal Hx of Anesthesia complications.   Social:  Non-Smoker, No Alcohol Use    Hematology/Oncology:  Hematology Normal   Oncology Normal     EENT/Dental:EENT/Dental Normal   Cardiovascular:   Exercise tolerance: good Hypertension, well controlled    Pulmonary:  Pulmonary Normal    Renal/:  Renal/ Normal     Hepatic/GI:  Hepatic/GI Normal    Musculoskeletal:  Musculoskeletal Normal    Neurological:  Neurology Normal    Endocrine:  Endocrine Normal    Dermatological:  Skin Normal    Psych:  Psychiatric Normal           Physical Exam  General:  Well nourished, Obesity    Airway/Jaw/Neck:  Airway Findings: Mouth Opening: Normal Tongue: Normal  General Airway Assessment: Adult  Mallampati: II  TM Distance: Normal, at least 6 cm  Jaw/Neck Findings:     Neck ROM: Normal ROM      Dental:  Dental Findings: In tact        Mental Status:  Mental Status Findings:  Cooperative, Alert and Oriented         Anesthesia Plan  Type of Anesthesia, risks & benefits discussed:  Anesthesia Type:  general  Patient's Preference:   Intra-op Monitoring Plan: standard ASA monitors  Intra-op Monitoring Plan Comments:   Post Op Pain Control Plan: multimodal analgesia  Post Op Pain Control Plan Comments:   Induction:   IV  Beta Blocker:  Patient is not currently on a Beta-Blocker (No further documentation required).       Informed Consent: Patient understands risks and agrees with Anesthesia plan.  Questions answered. Anesthesia consent signed with patient.  ASA Score: 2     Day of Surgery  Review of History & Physical: I have interviewed and examined the patient. I have reviewed the patient's H&P dated: 2/18/19. There are no significant changes.  H&P update referred to the surgeon.         Ready For Surgery From Anesthesia Perspective.

## 2019-02-18 ENCOUNTER — ANESTHESIA (OUTPATIENT)
Dept: SURGERY | Facility: HOSPITAL | Age: 54
End: 2019-02-18
Payer: MEDICAID

## 2019-02-18 ENCOUNTER — HOSPITAL ENCOUNTER (OUTPATIENT)
Facility: HOSPITAL | Age: 54
Discharge: HOME | End: 2019-02-19
Attending: OBSTETRICS & GYNECOLOGY | Admitting: OBSTETRICS & GYNECOLOGY
Payer: MEDICAID

## 2019-02-18 DIAGNOSIS — R10.2 PELVIC PAIN IN FEMALE: ICD-10-CM

## 2019-02-18 LAB — POCT GLUCOSE: 94 MG/DL (ref 70–110)

## 2019-02-18 PROCEDURE — 25000003 PHARM REV CODE 250: Performed by: NURSE ANESTHETIST, CERTIFIED REGISTERED

## 2019-02-18 PROCEDURE — 63600175 PHARM REV CODE 636 W HCPCS: Performed by: NURSE ANESTHETIST, CERTIFIED REGISTERED

## 2019-02-18 PROCEDURE — 00840 ANES IPER PX LOWER ABD NOS: CPT | Performed by: OBSTETRICS & GYNECOLOGY

## 2019-02-18 PROCEDURE — 37000009 HC ANESTHESIA EA ADD 15 MINS: Performed by: OBSTETRICS & GYNECOLOGY

## 2019-02-18 PROCEDURE — 36000708 HC OR TIME LEV III 1ST 15 MIN: Performed by: OBSTETRICS & GYNECOLOGY

## 2019-02-18 PROCEDURE — 58661 LAPAROSCOPY REMOVE ADNEXA: CPT | Mod: 80,50,, | Performed by: OBSTETRICS & GYNECOLOGY

## 2019-02-18 PROCEDURE — 27201423 OPTIME MED/SURG SUP & DEVICES STERILE SUPPLY: Performed by: OBSTETRICS & GYNECOLOGY

## 2019-02-18 PROCEDURE — 63600175 PHARM REV CODE 636 W HCPCS: Performed by: OBSTETRICS & GYNECOLOGY

## 2019-02-18 PROCEDURE — 00840 ANES IPER PX LOWER ABD NOS: CPT | Mod: QZ | Performed by: NURSE ANESTHETIST, CERTIFIED REGISTERED

## 2019-02-18 PROCEDURE — 36000709 HC OR TIME LEV III EA ADD 15 MIN: Performed by: OBSTETRICS & GYNECOLOGY

## 2019-02-18 PROCEDURE — 88305 TISSUE EXAM BY PATHOLOGIST: CPT | Mod: 26,,, | Performed by: PATHOLOGY

## 2019-02-18 PROCEDURE — 71000033 HC RECOVERY, INTIAL HOUR: Performed by: OBSTETRICS & GYNECOLOGY

## 2019-02-18 PROCEDURE — 37000008 HC ANESTHESIA 1ST 15 MINUTES: Performed by: OBSTETRICS & GYNECOLOGY

## 2019-02-18 PROCEDURE — 25000003 PHARM REV CODE 250: Performed by: OBSTETRICS & GYNECOLOGY

## 2019-02-18 PROCEDURE — 88305 TISSUE SPECIMEN TO PATHOLOGY - SURGERY: ICD-10-PCS | Mod: 26,,, | Performed by: PATHOLOGY

## 2019-02-18 PROCEDURE — 58661 PR LAP,RMV  ADNEXAL STRUCTURE: ICD-10-PCS | Mod: 50,,, | Performed by: OBSTETRICS & GYNECOLOGY

## 2019-02-18 PROCEDURE — 58661 PR LAP,RMV  ADNEXAL STRUCTURE: ICD-10-PCS | Mod: 80,50,, | Performed by: OBSTETRICS & GYNECOLOGY

## 2019-02-18 PROCEDURE — 58661 LAPAROSCOPY REMOVE ADNEXA: CPT | Mod: 50,,, | Performed by: OBSTETRICS & GYNECOLOGY

## 2019-02-18 PROCEDURE — S0077 INJECTION, CLINDAMYCIN PHOSP: HCPCS | Performed by: OBSTETRICS & GYNECOLOGY

## 2019-02-18 PROCEDURE — 88305 TISSUE EXAM BY PATHOLOGIST: CPT | Performed by: PATHOLOGY

## 2019-02-18 RX ORDER — DEXAMETHASONE SODIUM PHOSPHATE 4 MG/ML
INJECTION, SOLUTION INTRA-ARTICULAR; INTRALESIONAL; INTRAMUSCULAR; INTRAVENOUS; SOFT TISSUE
Status: DISCONTINUED | OUTPATIENT
Start: 2019-02-18 | End: 2019-02-18

## 2019-02-18 RX ORDER — HYDROMORPHONE HYDROCHLORIDE 2 MG/ML
INJECTION, SOLUTION INTRAMUSCULAR; INTRAVENOUS; SUBCUTANEOUS
Status: DISCONTINUED | OUTPATIENT
Start: 2019-02-18 | End: 2019-02-18

## 2019-02-18 RX ORDER — CIPROFLOXACIN 2 MG/ML
400 INJECTION, SOLUTION INTRAVENOUS
Status: COMPLETED | OUTPATIENT
Start: 2019-02-18 | End: 2019-02-18

## 2019-02-18 RX ORDER — ONDANSETRON 8 MG/1
8 TABLET, ORALLY DISINTEGRATING ORAL EVERY 8 HOURS PRN
Status: DISCONTINUED | OUTPATIENT
Start: 2019-02-18 | End: 2019-02-19 | Stop reason: HOSPADM

## 2019-02-18 RX ORDER — KETOROLAC TROMETHAMINE 30 MG/ML
INJECTION, SOLUTION INTRAMUSCULAR; INTRAVENOUS
Status: DISCONTINUED | OUTPATIENT
Start: 2019-02-18 | End: 2019-02-18

## 2019-02-18 RX ORDER — PROPOFOL 10 MG/ML
VIAL (ML) INTRAVENOUS
Status: DISCONTINUED | OUTPATIENT
Start: 2019-02-18 | End: 2019-02-18

## 2019-02-18 RX ORDER — SODIUM CHLORIDE, SODIUM LACTATE, POTASSIUM CHLORIDE, CALCIUM CHLORIDE 600; 310; 30; 20 MG/100ML; MG/100ML; MG/100ML; MG/100ML
INJECTION, SOLUTION INTRAVENOUS CONTINUOUS
Status: DISCONTINUED | OUTPATIENT
Start: 2019-02-18 | End: 2019-02-19 | Stop reason: HOSPADM

## 2019-02-18 RX ORDER — ONDANSETRON HYDROCHLORIDE 2 MG/ML
INJECTION, SOLUTION INTRAMUSCULAR; INTRAVENOUS
Status: DISCONTINUED | OUTPATIENT
Start: 2019-02-18 | End: 2019-02-18

## 2019-02-18 RX ORDER — MORPHINE SULFATE 10 MG/ML
10 INJECTION INTRAMUSCULAR; INTRAVENOUS; SUBCUTANEOUS EVERY 4 HOURS PRN
Status: DISCONTINUED | OUTPATIENT
Start: 2019-02-18 | End: 2019-02-19 | Stop reason: HOSPADM

## 2019-02-18 RX ORDER — LIDOCAINE HYDROCHLORIDE 20 MG/ML
INJECTION, SOLUTION EPIDURAL; INFILTRATION; INTRACAUDAL; PERINEURAL
Status: DISCONTINUED | OUTPATIENT
Start: 2019-02-18 | End: 2019-02-18

## 2019-02-18 RX ORDER — OXYCODONE AND ACETAMINOPHEN 5; 325 MG/1; MG/1
1 TABLET ORAL EVERY 4 HOURS PRN
Status: DISCONTINUED | OUTPATIENT
Start: 2019-02-18 | End: 2019-02-19 | Stop reason: HOSPADM

## 2019-02-18 RX ORDER — MIDAZOLAM HYDROCHLORIDE 1 MG/ML
INJECTION INTRAMUSCULAR; INTRAVENOUS
Status: DISCONTINUED | OUTPATIENT
Start: 2019-02-18 | End: 2019-02-18

## 2019-02-18 RX ORDER — KETOROLAC TROMETHAMINE 30 MG/ML
30 INJECTION, SOLUTION INTRAMUSCULAR; INTRAVENOUS EVERY 6 HOURS PRN
Status: DISCONTINUED | OUTPATIENT
Start: 2019-02-18 | End: 2019-02-19 | Stop reason: HOSPADM

## 2019-02-18 RX ORDER — CLINDAMYCIN PHOSPHATE 600 MG/50ML
600 INJECTION, SOLUTION INTRAVENOUS
Status: COMPLETED | OUTPATIENT
Start: 2019-02-18 | End: 2019-02-18

## 2019-02-18 RX ORDER — NEOSTIGMINE METHYLSULFATE 5 MG/5 ML
SYRINGE (ML) INTRAVENOUS
Status: DISCONTINUED | OUTPATIENT
Start: 2019-02-18 | End: 2019-02-18

## 2019-02-18 RX ORDER — GLYCOPYRROLATE 0.2 MG/ML
INJECTION INTRAMUSCULAR; INTRAVENOUS
Status: DISCONTINUED | OUTPATIENT
Start: 2019-02-18 | End: 2019-02-18

## 2019-02-18 RX ORDER — OXYCODONE AND ACETAMINOPHEN 5; 325 MG/1; MG/1
1 TABLET ORAL EVERY 4 HOURS PRN
Qty: 30 TABLET | Refills: 0 | Status: SHIPPED | OUTPATIENT
Start: 2019-02-18 | End: 2019-02-28 | Stop reason: SDUPTHER

## 2019-02-18 RX ORDER — ACETAMINOPHEN 10 MG/ML
INJECTION, SOLUTION INTRAVENOUS
Status: DISCONTINUED | OUTPATIENT
Start: 2019-02-18 | End: 2019-02-18

## 2019-02-18 RX ORDER — DIPHENHYDRAMINE HCL 25 MG
25 CAPSULE ORAL EVERY 4 HOURS PRN
Status: DISCONTINUED | OUTPATIENT
Start: 2019-02-18 | End: 2019-02-19 | Stop reason: HOSPADM

## 2019-02-18 RX ORDER — FENTANYL CITRATE 50 UG/ML
INJECTION, SOLUTION INTRAMUSCULAR; INTRAVENOUS
Status: DISCONTINUED | OUTPATIENT
Start: 2019-02-18 | End: 2019-02-18

## 2019-02-18 RX ORDER — ROCURONIUM BROMIDE 10 MG/ML
INJECTION, SOLUTION INTRAVENOUS
Status: DISCONTINUED | OUTPATIENT
Start: 2019-02-18 | End: 2019-02-18

## 2019-02-18 RX ADMIN — ROCURONIUM BROMIDE 35 MG: 10 INJECTION, SOLUTION INTRAVENOUS at 03:02

## 2019-02-18 RX ADMIN — SODIUM CHLORIDE, SODIUM LACTATE, POTASSIUM CHLORIDE, AND CALCIUM CHLORIDE: .6; .31; .03; .02 INJECTION, SOLUTION INTRAVENOUS at 03:02

## 2019-02-18 RX ADMIN — FENTANYL CITRATE 25 MCG: 50 INJECTION, SOLUTION INTRAMUSCULAR; INTRAVENOUS at 03:02

## 2019-02-18 RX ADMIN — CIPROFLOXACIN 400 MG: 2 INJECTION, SOLUTION INTRAVENOUS at 03:02

## 2019-02-18 RX ADMIN — Medication 4 MG: at 03:02

## 2019-02-18 RX ADMIN — KETOROLAC TROMETHAMINE 30 MG: 30 INJECTION, SOLUTION INTRAMUSCULAR; INTRAVENOUS at 03:02

## 2019-02-18 RX ADMIN — HYDROMORPHONE HYDROCHLORIDE 1 MG: 2 INJECTION, SOLUTION INTRAMUSCULAR; INTRAVENOUS; SUBCUTANEOUS at 04:02

## 2019-02-18 RX ADMIN — PROMETHAZINE HYDROCHLORIDE 12.5 MG: 25 INJECTION INTRAMUSCULAR; INTRAVENOUS at 07:02

## 2019-02-18 RX ADMIN — ONDANSETRON 8 MG: 2 INJECTION, SOLUTION INTRAMUSCULAR; INTRAVENOUS at 03:02

## 2019-02-18 RX ADMIN — FENTANYL CITRATE 150 MCG: 50 INJECTION, SOLUTION INTRAMUSCULAR; INTRAVENOUS at 03:02

## 2019-02-18 RX ADMIN — MIDAZOLAM HYDROCHLORIDE 2 MG: 1 INJECTION, SOLUTION INTRAMUSCULAR; INTRAVENOUS at 03:02

## 2019-02-18 RX ADMIN — GLYCOPYRROLATE 0.4 MG: 0.2 INJECTION INTRAMUSCULAR; INTRAVENOUS at 03:02

## 2019-02-18 RX ADMIN — DEXAMETHASONE SODIUM PHOSPHATE 8 MG: 4 INJECTION, SOLUTION INTRAMUSCULAR; INTRAVENOUS at 03:02

## 2019-02-18 RX ADMIN — ACETAMINOPHEN 1000 MG: 10 INJECTION, SOLUTION INTRAVENOUS at 03:02

## 2019-02-18 RX ADMIN — OXYCODONE AND ACETAMINOPHEN 1 TABLET: 5; 325 TABLET ORAL at 08:02

## 2019-02-18 RX ADMIN — PROPOFOL 160 MG: 10 INJECTION, EMULSION INTRAVENOUS at 03:02

## 2019-02-18 RX ADMIN — FENTANYL CITRATE 50 MCG: 50 INJECTION, SOLUTION INTRAMUSCULAR; INTRAVENOUS at 03:02

## 2019-02-18 RX ADMIN — LIDOCAINE HYDROCHLORIDE 80 MG: 20 INJECTION, SOLUTION EPIDURAL; INFILTRATION; INTRACAUDAL; PERINEURAL at 03:02

## 2019-02-18 RX ADMIN — CLINDAMYCIN PHOSPHATE 600 MG: 12 INJECTION, SOLUTION INTRAVENOUS at 03:02

## 2019-02-18 RX ADMIN — OXYCODONE AND ACETAMINOPHEN 1 TABLET: 5; 325 TABLET ORAL at 11:02

## 2019-02-18 NOTE — ANESTHESIA POSTPROCEDURE EVALUATION
"Anesthesia Post Evaluation    Patient: Yaneth Tejada    Procedure(s) Performed: Procedure(s) (LRB):  SALPINGO-OOPHORECTOMY, LAPAROSCOPIC (Bilateral)    Final Anesthesia Type: general  Patient location during evaluation: PACU  Patient participation: Yes- Able to Participate  Level of consciousness: awake and alert and oriented  Post-procedure vital signs: reviewed and stable  Pain management: adequate  Airway patency: patent  PONV status at discharge: No PONV  Anesthetic complications: no      Cardiovascular status: blood pressure returned to baseline and hemodynamically stable  Respiratory status: unassisted, spontaneous ventilation and room air  Hydration status: euvolemic  Follow-up not needed.        Visit Vitals  BP (!) 119/55 (BP Location: Left arm, Patient Position: Lying)   Pulse 97   Temp 37.7 °C (99.9 °F) (Oral)   Resp 20   Ht 5' 2" (1.575 m)   Wt 108.3 kg (238 lb 12.1 oz)   SpO2 95%   Breastfeeding? No   BMI 43.67 kg/m²       Pain/Sagar Score: Pain Rating Prior to Med Admin: 10 (2/18/2019  4:10 PM)  Pain Rating Post Med Admin: 10 (2/18/2019  4:30 PM)  Sagar Score: 8 (2/18/2019  4:20 PM)        "

## 2019-02-18 NOTE — TRANSFER OF CARE
"Anesthesia Transfer of Care Note    Patient: Yaneth Tejada    Procedure(s) Performed: Procedure(s) (LRB):  SALPINGO-OOPHORECTOMY, LAPAROSCOPIC (Bilateral)    Patient location: PACU    Anesthesia Type: general    Transport from OR: Transported from OR on 6-10 L/min O2 by face mask with adequate spontaneous ventilation    Post pain: adequate analgesia    Post assessment: no apparent anesthetic complications and tolerated procedure well    Post vital signs: stable    Level of consciousness: sedated    Nausea/Vomiting: no nausea/vomiting    Complications: none    Transfer of care protocol was followed      Last vitals:   Visit Vitals  BP (!) 148/83 (BP Location: Left arm, Patient Position: Sitting)   Pulse 90   Temp 37.7 °C (99.9 °F) (Oral)   Resp 18   Ht 5' 2" (1.575 m)   Wt 108.3 kg (238 lb 12.1 oz)   SpO2 99%   Breastfeeding? No   BMI 43.67 kg/m²     "

## 2019-02-18 NOTE — OP NOTE
Surgery Date: 2/18/2019     Surgeon(s) and Role:     * Carrington Us MD - Primary     * Gustavo Rodriguez MD - Assisting    Pre-op Diagnosis:    Pelvic pain in female [R10.2]    Post-op Diagnosis:    Same    Procedure(s):  Procedure(s):  SALPINGO-OOPHORECTOMY, LAPAROSCOPIC    Specimens (From admission, onward)    Start     Ordered    02/18/19 1547  Specimen to Pathology - Surgery  Once     Start Status   02/18/19 1547 Needs to be Collected       02/18/19 1546          Anesthesia: General    EBL: 5cc    Procedure in Detail:  The patient was placed on the operating table in the dorsal supine position and given satisfactory general endotracheal anesthesia.  She was then placed in the lithotomy position. The abdomen was prepped with Duraprep and the vagina was prepped with Hibiclens. A Salgado catheter was placed into the bladder for drainage.  She was then draped in the usual manner for laparoscopic procedure.     After assuring adequate anesthesia, a 1 cm infraumbilical skin incision was then made. With the asistant elevating the abdominal wall using Pita clamps, a Veress needle was introduced into the abdomen without difficulty. The abdomen was inflated with CO2 to a pressure of 15mm Hg.  The Veress needle was removed and a 10 mm Optiview port was then placed in the umbilicus without difficulty using the laparoscope as guidance for entering the abdomen. Following this a 10mm port was also placed in the right lower quadrant and a 5mm port placed in the left lower quadrant under direct visualization.    The pelvis was inspected. The uterus had been previously surgically removed.  The left ovary appeared normal. The right ovary was slightly larger than the left and revealed a discoloration compatible with a small blood containing cyst. The pelvic peritoneum appeared Normal. The upper abdomen was inspected and appeared Normal.    At this time, a Procedure(s):  SALPINGO-OOPHORECTOMY, LAPAROSCOPIC was begun.  The  right infundibulopelvic ligament was identified. A 5 mm grasping forceps was used to elevate the right ovary and expose the infundibulopelvic ligament.. The right ureter was identified. At this time the right infundibulopelvic ligament was coagulated with bipolar cautery and transected using a Harmonic scalpel. The remaining upper mesovarium and mesosalpinx was transected  wih the harmonic scalpel and the ovary placed in the culdesac for later retrieval.   Next, the left ureter was identified. At this time the left infundibulopelvic ligament was coagulated with bipolar cautery and transected using a Harmonic scalpel. The remaining upper mesovarium and mesosalpinx was transected  wih the harmonic scalpel and the ovary placed in.an EndoCatch bag along with the right ovary and tube, and removed through the right lower quadrant incision.      At this point, hemostasis was adequate.  The pelvis was irrigated with saline until clear.      The abdomen was deflated of CO2 and the trocars were then removed from the abdomen.  The skin wounds were approximated with 3-0 Vicryl subcuticular suture.     The patient tolerated the procedure well.  She was awake and alert leaving the operating room with vital signs stable.      Estimated blood loss was approximately       5cc.

## 2019-02-18 NOTE — INTERVAL H&P NOTE
The patient has been examined and the H&P has been reviewed:        I concur with the findings and no changes have occurred since H&P was written.        Patient cleared for Anesthesia: General        Anesthesia/Surgery risks, benefits and alternative options discussed and understood by patient/family.      Active Hospital Problems    Diagnosis  POA    Pelvic pain in female [R10.2]  Yes      Resolved Hospital Problems   No resolved problems to display.

## 2019-02-19 PROBLEM — N83.201 OVARIAN CYST, RIGHT: Status: ACTIVE | Noted: 2019-02-19

## 2019-02-19 PROCEDURE — 25000003 PHARM REV CODE 250: Performed by: OBSTETRICS & GYNECOLOGY

## 2019-02-19 PROCEDURE — 63600175 PHARM REV CODE 636 W HCPCS: Performed by: OBSTETRICS & GYNECOLOGY

## 2019-02-19 RX ADMIN — KETOROLAC TROMETHAMINE 30 MG: 30 INJECTION, SOLUTION INTRAMUSCULAR at 05:02

## 2019-02-19 RX ADMIN — OXYCODONE AND ACETAMINOPHEN 1 TABLET: 5; 325 TABLET ORAL at 05:02

## 2019-02-19 RX ADMIN — OXYCODONE AND ACETAMINOPHEN 1 TABLET: 5; 325 TABLET ORAL at 09:02

## 2019-02-19 NOTE — PROGRESS NOTES
Staff Handoff  Bedside report per WOLF Vuong. No distress noted. Room air. Awake, alert, oriented. Incisions dry and intact. No vaginal bleeding noted. SCDs in use. Patient complains of nausea and abdominal discomfort. Call bell in reach. Encouraged to call for assistance.     Resident Handoff

## 2019-02-19 NOTE — NURSING
Care assumed after report received from Delmi BLOOM.  Patient transported to 3rd floor room 313. Patient in no distress.  AAOx3.  No complaints any discomforts. No bleeding noted to surgical sites.  Bed in low position.  Side rails up x 2.  Call bell in reach.  Will continue to monitor.

## 2019-02-19 NOTE — PLAN OF CARE
Problem: Adult Inpatient Plan of Care  Goal: Plan of Care Review  Outcome: Ongoing (interventions implemented as appropriate)  Patient rested well throughout shift. Room air. Alert and oriented. Neuro intact. Incisions dry and intact. No vaginal bleeding noted. Patient complains of abdominal pain. Controlled with PO pain medicine. Patient complained of slight nausea, relieved by medication. No vomiting noted. Patient up to bathroom with stand by assist. Tolerating regular diet well. SCDs in use. Free from fall or injury. Plan of care reviewed with patient.

## 2019-02-19 NOTE — NURSING
Patient discharged.  NADN.  IV d/c'd.  Catheter tip intact.  Pressure dressing applied.  Discharge instructions and follow up appointment given.  Prescription delivered to bedside.  Patient instructed not to place anything into vagina until instructed by MD.  Patient verbalized understanding.  Patient transported to [facility] via wheelchair escorted by transport staff.

## 2019-02-19 NOTE — NURSING
Patient transported to third floor room 313 from admit.  NADN.  Patient in no distress.  AAOx3 with no complaints of chest pain or any other discomfort.  Patient oriented to room, bed in low position, side rails up x 2, call bell in reach.

## 2019-02-19 NOTE — DISCHARGE SUMMARY
Discharge Summary:  2/19/2019    Admit Date: 2/18/2019    Discharge Date:     Attending Physician: Carrington Us M.D., FACOG     Reason for Admission: Ovarian cyst, right    Patient Active Problem List    Diagnosis Date Noted    Ovarian cyst, right 02/19/2019    Pelvic pain in female 02/18/2019       Hospital Course: Hospital Day: 2    1 Day Post-OpProcedure(s) (LRB):  SALPINGO-OOPHORECTOMY, LAPAROSCOPIC (Bilateral)     Feeling well. Ambulating without problems. Tolerating regular diet. Voiding without difficulty. Vital signs stable. She has been afebrile postoperatively.       Physical Exam:   Chest: Clear to auscultation   Cardiovascular: Regular rate and Rhythm. No tachycardia   Abd: soft, bowel sounds active    Incision: Healing well. No erythema, significant drainage. Staples intact.      Procedures Performed:  Procedure(s) (LRB):  SALPINGO-OOPHORECTOMY, LAPAROSCOPIC (Bilateral)     Discharge Diagnosis:   1. Pelvic pain in female    2.      Recurrent Right ovarian cysts    Condition on Discharge: good    Discharge Activity: ambulate in house    Patient Instructions:  Current Discharge Medication List      START taking these medications    Details   oxyCODONE-acetaminophen (PERCOCET) 5-325 mg per tablet Take 1 tablet by mouth every 4 (four) hours as needed for Pain.  Qty: 30 tablet, Refills: 0         CONTINUE these medications which have NOT CHANGED    Details   losartan-hydrochlorothiazide 100-12.5 mg (HYZAAR) 100-12.5 mg Tab Take 1 tablet by mouth once daily.  Refills: 5      clonazePAM (KLONOPIN) 0.5 MG tablet Take 0.5 mg by mouth daily as needed.  Refills: 1      FLUoxetine 20 MG capsule Take 20 mg by mouth once daily.  Refills: 5             Discharge Procedure Orders   Diet general     Call MD for:  temperature >100.4     Call MD for:  persistent nausea and vomiting     Call MD for:  severe uncontrolled pain     Call MD for:  difficulty breathing, headache or visual disturbances     Call MD for:   redness, tenderness, or signs of infection (pain, swelling, redness, odor or green/yellow discharge around incision site)     Call MD for:  hives     Call MD for:  persistent dizziness or light-headedness     Call MD for:  extreme fatigue     Call MD for:   Order Comments: Excessive Vaginal Bleeding     Change dressing (specify)   Order Comments: Dressing change: 2 times per day or if becomes soiled or wet.     Activity as tolerated

## 2019-02-21 VITALS
SYSTOLIC BLOOD PRESSURE: 121 MMHG | RESPIRATION RATE: 18 BRPM | DIASTOLIC BLOOD PRESSURE: 67 MMHG | TEMPERATURE: 98 F | BODY MASS INDEX: 43.94 KG/M2 | HEIGHT: 62 IN | OXYGEN SATURATION: 96 % | HEART RATE: 87 BPM | WEIGHT: 238.75 LBS

## 2019-02-28 ENCOUNTER — OFFICE VISIT (OUTPATIENT)
Dept: OBSTETRICS AND GYNECOLOGY | Facility: CLINIC | Age: 54
End: 2019-02-28
Payer: MEDICAID

## 2019-02-28 VITALS
HEART RATE: 82 BPM | DIASTOLIC BLOOD PRESSURE: 92 MMHG | BODY MASS INDEX: 42.99 KG/M2 | SYSTOLIC BLOOD PRESSURE: 134 MMHG | WEIGHT: 233.63 LBS | RESPIRATION RATE: 18 BRPM | HEIGHT: 62 IN

## 2019-02-28 DIAGNOSIS — Z09 POSTOP CHECK: Primary | ICD-10-CM

## 2019-02-28 PROCEDURE — 99213 OFFICE O/P EST LOW 20 MIN: CPT | Mod: PBBFAC | Performed by: OBSTETRICS & GYNECOLOGY

## 2019-02-28 PROCEDURE — 99024 PR POST-OP FOLLOW-UP VISIT: ICD-10-PCS | Mod: ,,, | Performed by: OBSTETRICS & GYNECOLOGY

## 2019-02-28 PROCEDURE — 99024 POSTOP FOLLOW-UP VISIT: CPT | Mod: ,,, | Performed by: OBSTETRICS & GYNECOLOGY

## 2019-02-28 PROCEDURE — 99999 PR PBB SHADOW E&M-EST. PATIENT-LVL III: ICD-10-PCS | Mod: PBBFAC,,, | Performed by: OBSTETRICS & GYNECOLOGY

## 2019-02-28 PROCEDURE — 99999 PR PBB SHADOW E&M-EST. PATIENT-LVL III: CPT | Mod: PBBFAC,,, | Performed by: OBSTETRICS & GYNECOLOGY

## 2019-02-28 RX ORDER — OXYCODONE AND ACETAMINOPHEN 5; 325 MG/1; MG/1
1 TABLET ORAL EVERY 4 HOURS PRN
Qty: 30 TABLET | Refills: 0 | Status: SHIPPED | OUTPATIENT
Start: 2019-02-28 | End: 2019-03-20

## 2019-02-28 RX ORDER — DICLOFENAC SODIUM 75 MG/1
75 TABLET, DELAYED RELEASE ORAL 2 TIMES DAILY
Qty: 30 TABLET | Refills: 1 | Status: SHIPPED | OUTPATIENT
Start: 2019-02-28 | End: 2019-03-20

## 2019-02-28 NOTE — PROGRESS NOTES
Subjective:    Patient ID: Yaneth Tejada is a 54 y.o.. Female.    Chief Complaint:   Chief Complaint   Patient presents with    Post-op Evaluation     1 week PO; BSO       History of Present Illness:   Yaneth presents today one week weeks Post Op bilateral oophorectomy and is here for followup.  She is voiding well and having normal bowel movements. She reports  She is still having pain when walking but states she is not having the pain she had before surgery. She is  Not having vaginal bleeding, or fever. Pain has been well controlled with meds when she has pain.        Objective:   Vital Signs:  Vitals:    02/28/19 1026   BP: (!) 134/92   Pulse: 82   Resp: 18       Physical Exam:  General:  alert,normal appearing female   Abdomen:  soft, non-tender; bowel sounds normal. Incision healing well   Pelvis: deferred     Recent lab:  Lab Results   Component Value Date    WBC 10.09 02/13/2019    HGB 13.5 02/13/2019    HCT 42.6 02/13/2019    MCV 92 02/13/2019     02/13/2019       Impresssion:  Encounter Diagnosis   Name Primary?    Postop check Yes         Plan:  Postop check    Other orders  -     oxyCODONE-acetaminophen (PERCOCET) 5-325 mg per tablet; Take 1 tablet by mouth every 4 (four) hours as needed for Pain.  Dispense: 30 tablet; Refill: 0  -     diclofenac (VOLTAREN) 75 MG EC tablet; Take 1 tablet (75 mg total) by mouth 2 (two) times daily.  Dispense: 30 tablet; Refill: 1        Return in 4 weeks or PRN if having postoperative complications  Reviewed postoperative precautions and instructions.  She verberlizes understanding

## 2019-03-11 ENCOUNTER — TELEPHONE (OUTPATIENT)
Dept: OBSTETRICS AND GYNECOLOGY | Facility: CLINIC | Age: 54
End: 2019-03-11

## 2019-03-11 NOTE — TELEPHONE ENCOUNTER
Pt called back states forgot she was doing clothes and cleaned out the fridge yesterday. Reports did a good bit of bending and lifting. Pt instructed to rest and if pain doesn't subside will need appt. Voiced understanding.

## 2019-03-11 NOTE — TELEPHONE ENCOUNTER
Pt called states is 3 weeks s/p salpingo-oophorectomy and still experiencing pain. Reports pain is 7/10. Pain is relieved by pain medication, but returns once medication wears off. Pt reports normal bowel movements. Reports no increase in activity. Instructed pt to come in for evaluation. Pt states does not have ride and would like to know if more pain medication can be called in. Instructed pt she should not be in this much pain 3 weeks out from surgery and recommend coming in for evaluation again. Pt states will call back if she can get a ride.

## 2019-03-11 NOTE — TELEPHONE ENCOUNTER
----- Message from Wendy Carlin MA sent at 3/11/2019 11:32 AM CDT -----  Contact: juancarlos Tejada  MRN: [id number]  Home Phone      [phone]  Work Phone      Not on file.  Mobile          [phone]    Patient Care Team:  Cash Dempsey MD as PCP - General (Family Medicine)  Carrington Us MD as Obstetrician (Obstetrics)  OB? No  What phone number can you be reached at?  [phone]  Message:   Needs to speak to nurse regarding left side pain since surgery.

## 2019-03-19 ENCOUNTER — TELEPHONE (OUTPATIENT)
Dept: OBSTETRICS AND GYNECOLOGY | Facility: CLINIC | Age: 54
End: 2019-03-19

## 2019-03-19 NOTE — TELEPHONE ENCOUNTER
Pt called states still hurting from surgery almost 4 weeks ago. Asked her again what type of activity she has been doing. Pt reports doing the laundry and other house chores. Instructed pt to stop doing those things and rest. Crying on the phone saying she is in pain, informed pt if pain is that severe she needs to report to the ER. Pt refuses. Appt given tomorrow for evaluation with Dr. Us. Pt aware.

## 2019-03-20 ENCOUNTER — OFFICE VISIT (OUTPATIENT)
Dept: OBSTETRICS AND GYNECOLOGY | Facility: CLINIC | Age: 54
End: 2019-03-20
Payer: MEDICAID

## 2019-03-20 VITALS
WEIGHT: 233.81 LBS | BODY MASS INDEX: 43.02 KG/M2 | HEART RATE: 86 BPM | HEIGHT: 62 IN | SYSTOLIC BLOOD PRESSURE: 156 MMHG | DIASTOLIC BLOOD PRESSURE: 94 MMHG | RESPIRATION RATE: 18 BRPM

## 2019-03-20 DIAGNOSIS — M86.9 OSTEITIS PUBIS: ICD-10-CM

## 2019-03-20 DIAGNOSIS — Z09 POSTOP CHECK: Primary | ICD-10-CM

## 2019-03-20 PROCEDURE — 99999 PR PBB SHADOW E&M-EST. PATIENT-LVL III: ICD-10-PCS | Mod: PBBFAC,,, | Performed by: OBSTETRICS & GYNECOLOGY

## 2019-03-20 PROCEDURE — 99999 PR PBB SHADOW E&M-EST. PATIENT-LVL III: CPT | Mod: PBBFAC,,, | Performed by: OBSTETRICS & GYNECOLOGY

## 2019-03-20 PROCEDURE — 99024 PR POST-OP FOLLOW-UP VISIT: ICD-10-PCS | Mod: ,,, | Performed by: OBSTETRICS & GYNECOLOGY

## 2019-03-20 PROCEDURE — 99024 POSTOP FOLLOW-UP VISIT: CPT | Mod: ,,, | Performed by: OBSTETRICS & GYNECOLOGY

## 2019-03-20 PROCEDURE — 99213 OFFICE O/P EST LOW 20 MIN: CPT | Mod: PBBFAC | Performed by: OBSTETRICS & GYNECOLOGY

## 2019-03-20 RX ORDER — CLOTRIMAZOLE AND BETAMETHASONE DIPROPIONATE 10; .64 MG/G; MG/G
CREAM TOPICAL 2 TIMES DAILY
Qty: 15 G | Refills: 1 | Status: SHIPPED | OUTPATIENT
Start: 2019-03-20 | End: 2019-04-19

## 2019-03-20 RX ORDER — MELOXICAM 15 MG/1
15 TABLET ORAL DAILY
Qty: 30 TABLET | Refills: 0 | Status: SHIPPED | OUTPATIENT
Start: 2019-03-20 | End: 2019-03-20

## 2019-03-20 RX ORDER — CLOTRIMAZOLE AND BETAMETHASONE DIPROPIONATE 10; .64 MG/G; MG/G
CREAM TOPICAL 2 TIMES DAILY
Qty: 15 G | Refills: 1 | Status: SHIPPED | OUTPATIENT
Start: 2019-03-20 | End: 2019-03-20

## 2019-03-20 RX ORDER — HYDROCODONE BITARTRATE AND ACETAMINOPHEN 5; 325 MG/1; MG/1
1 TABLET ORAL EVERY 6 HOURS PRN
Qty: 20 TABLET | Refills: 0 | Status: SHIPPED | OUTPATIENT
Start: 2019-03-20 | End: 2019-03-27 | Stop reason: SDUPTHER

## 2019-03-20 RX ORDER — MELOXICAM 15 MG/1
15 TABLET ORAL DAILY
Qty: 30 TABLET | Refills: 0 | Status: SHIPPED | OUTPATIENT
Start: 2019-03-20 | End: 2019-11-18

## 2019-03-20 NOTE — PROGRESS NOTES
Subjective:    Patient ID: Yaneth Tejada is a 54 y.o.. Female.    Chief Complaint:   Chief Complaint   Patient presents with    Post-op Evaluation     4 weeks PO; BSO       History of Present Illness:   Yaneth presents today four weeks weeks Post Op Laparoscopic  bilateral oophorectomy and is here for followup.  She is voiding well and having normal bowel movements. She reports  increasing pain, especially when rising or lifting. She denies N/V, fever, chills. Pain has been well controlled with meds when she has pain. She describes the pain as suprapubic and intense.    Pathology: Normal.      Objective:   Vital Signs:  Vitals:    03/20/19 1120   BP: (!) 156/94   Pulse: 86   Resp: 18       Physical Exam:  General:  alert,normal appearing female   Abdomen:  soft, non-tender; bowel sounds normal. Incision healing well   Pelvis: deferred     Recent lab:  Lab Results   Component Value Date    WBC 10.09 02/13/2019    HGB 13.5 02/13/2019    HCT 42.6 02/13/2019    MCV 92 02/13/2019     02/13/2019       Impresssion:  Encounter Diagnoses   Name Primary?    Postop check Yes    Osteitis pubis          Plan:  Postop check    Osteitis pubis    Other orders  -     Discontinue: clotrimazole-betamethasone 1-0.05% (LOTRISONE) cream; Apply topically 2 (two) times daily.  Dispense: 15 g; Refill: 1  -     Discontinue: meloxicam (MOBIC) 15 MG tablet; Take 1 tablet (15 mg total) by mouth once daily.  Dispense: 30 tablet; Refill: 0  -     HYDROcodone-acetaminophen (NORCO) 5-325 mg per tablet; Take 1 tablet by mouth every 6 (six) hours as needed for Pain.  Dispense: 20 tablet; Refill: 0  -     clotrimazole-betamethasone 1-0.05% (LOTRISONE) cream; Apply topically to affected area(s) 2 (two) times daily.  Dispense: 15 g; Refill: 1  -     meloxicam (MOBIC) 15 MG tablet; Take 1 tablet (15 mg total) by mouth once daily.  Dispense: 30 tablet; Refill: 0        Return in 1 weeks or PRN if having postoperative complications  Reviewed  postoperative precautions and instructions.  She verberlizes understanding

## 2019-03-27 ENCOUNTER — OFFICE VISIT (OUTPATIENT)
Dept: OBSTETRICS AND GYNECOLOGY | Facility: CLINIC | Age: 54
End: 2019-03-27
Payer: MEDICAID

## 2019-03-27 ENCOUNTER — HOSPITAL ENCOUNTER (OUTPATIENT)
Dept: RADIOLOGY | Facility: HOSPITAL | Age: 54
Discharge: HOME | End: 2019-03-27
Attending: OBSTETRICS & GYNECOLOGY
Payer: MEDICAID

## 2019-03-27 VITALS
WEIGHT: 232 LBS | BODY MASS INDEX: 42.69 KG/M2 | DIASTOLIC BLOOD PRESSURE: 94 MMHG | HEIGHT: 62 IN | RESPIRATION RATE: 18 BRPM | HEART RATE: 84 BPM | SYSTOLIC BLOOD PRESSURE: 150 MMHG

## 2019-03-27 DIAGNOSIS — M86.9 OSTEITIS PUBIS: ICD-10-CM

## 2019-03-27 DIAGNOSIS — M86.9 OSTEITIS PUBIS: Primary | ICD-10-CM

## 2019-03-27 PROCEDURE — 99999 PR PBB SHADOW E&M-EST. PATIENT-LVL III: ICD-10-PCS | Mod: PBBFAC,,, | Performed by: OBSTETRICS & GYNECOLOGY

## 2019-03-27 PROCEDURE — 72170 X-RAY EXAM OF PELVIS: CPT | Mod: 26,,, | Performed by: RADIOLOGY

## 2019-03-27 PROCEDURE — 99213 OFFICE O/P EST LOW 20 MIN: CPT | Mod: PBBFAC,25 | Performed by: OBSTETRICS & GYNECOLOGY

## 2019-03-27 PROCEDURE — 72170 XR PELVIS ROUTINE AP: ICD-10-PCS | Mod: 26,,, | Performed by: RADIOLOGY

## 2019-03-27 PROCEDURE — 99213 PR OFFICE/OUTPT VISIT, EST, LEVL III, 20-29 MIN: ICD-10-PCS | Mod: S$PBB,,, | Performed by: OBSTETRICS & GYNECOLOGY

## 2019-03-27 PROCEDURE — 72170 X-RAY EXAM OF PELVIS: CPT | Mod: TC

## 2019-03-27 PROCEDURE — 99999 PR PBB SHADOW E&M-EST. PATIENT-LVL III: CPT | Mod: PBBFAC,,, | Performed by: OBSTETRICS & GYNECOLOGY

## 2019-03-27 PROCEDURE — 99213 OFFICE O/P EST LOW 20 MIN: CPT | Mod: S$PBB,,, | Performed by: OBSTETRICS & GYNECOLOGY

## 2019-03-27 RX ORDER — HYDROCODONE BITARTRATE AND ACETAMINOPHEN 5; 325 MG/1; MG/1
1 TABLET ORAL EVERY 6 HOURS PRN
Qty: 20 TABLET | Refills: 0 | Status: SHIPPED | OUTPATIENT
Start: 2019-03-27 | End: 2019-04-06

## 2019-03-27 NOTE — PROGRESS NOTES
Subjective:    Patient ID: Yaneth Tejada is a 54 y.o.. Female.    Chief Complaint:   Chief Complaint   Patient presents with    Post-op Evaluation     5 weeks PO        History of Present Illness:   Yaneth presents today for follow up of suspected osteitis pubis. She was started on NSAIDS last week and states she is not getting any relief.. She reports  Continued pain, especially when rising or lifting. She denies N/V, fever, chills. Pain has only been controlled with narcotic medication when she has pain. She describes the pain as suprapubic and intense.    Pathology: Normal.      Objective:   Vital Signs:  Vitals:    03/27/19 1054   BP: (!) 150/94   Pulse: 84   Resp: 18       Physical Exam:  General:  alert,normal appearing female   Abdomen:  soft, non-tender; bowel sounds normal. Incision healing well   Pelvis: Deferred There is tenderness to pressure on the symphysis pubis. There is also pain on Adductor testing.     Recent lab:  Lab Results   Component Value Date    WBC 10.09 02/13/2019    HGB 13.5 02/13/2019    HCT 42.6 02/13/2019    MCV 92 02/13/2019     02/13/2019       Impresssion:  Encounter Diagnosis   Name Primary?    Osteitis pubis Yes         Plan:  Osteitis pubis  -     X-Ray Pelvis Routine AP; Future; Expected date: 03/27/2019  -     HYDROcodone-acetaminophen (NORCO) 5-325 mg per tablet; Take 1 tablet by mouth every 6 (six) hours as needed for Pain.  Dispense: 20 tablet; Refill: 0        Return in 1 weeks or PRN if having postoperative complications  Reviewed postoperative precautions and instructions.  She verberlizes understanding

## 2019-03-29 ENCOUNTER — TELEPHONE (OUTPATIENT)
Dept: OBSTETRICS AND GYNECOLOGY | Facility: CLINIC | Age: 54
End: 2019-03-29

## 2019-03-29 DIAGNOSIS — M86.9 OSTEITIS PUBIS: Primary | ICD-10-CM

## 2019-03-29 NOTE — TELEPHONE ENCOUNTER
----- Message from Denita Carlin sent at 3/29/2019 11:03 AM CDT -----  Contact: juancarlos Tejada  MRN: [id number]  Home Phone      [phone]  Work Phone      Not on file.  Mobile          [phone]    Patient Care Team:  Cash Dempsey MD as PCP - General (Family Medicine)  Carrington Us MD as Obstetrician (Obstetrics)  OB? No  What phone number can you be reached at? [phone]  Message:  Pt would like x-ray results. Please advise, thanks.

## 2019-04-01 NOTE — TELEPHONE ENCOUNTER
Please inform that her XRays were normal. She may need a CT scan. I would like her to see an orthopedist prior to ordering a CT scan. Referral placed. Please schedule to see ortho at [facility] as no one in Ortho here takes Medicaid.

## 2019-04-01 NOTE — TELEPHONE ENCOUNTER
Patient calling back. States she would like her xray results, but would only like to speak to Dr. Us. Please advise. Patients phone number: [phone]

## 2019-04-02 NOTE — TELEPHONE ENCOUNTER
Attempted to contact pt, left voicemail to contact clinic.  Mj states that Dr Beckham's office will call pt to schedule after reviewing the referral.

## 2019-04-09 ENCOUNTER — TELEPHONE (OUTPATIENT)
Dept: OBSTETRICS AND GYNECOLOGY | Facility: CLINIC | Age: 54
End: 2019-04-09

## 2019-04-09 NOTE — TELEPHONE ENCOUNTER
----- Message from Wendy Carlin MA sent at 4/9/2019 10:05 AM CDT -----  Contact: juancarlos Tejada  MRN: [id number]  Home Phone      [phone]  Work Phone      Not on file.  Mobile          [phone]    Patient Care Team:  Cash Dempsey MD as PCP - General (Family Medicine)  Carrington Us MD as Obstetrician (Obstetrics)  OB? No  What phone number can you be reached at?  [phone]  Message:   Would like to speak to Dr Us himself regarding abd / hip pain she is having.

## 2019-04-09 NOTE — TELEPHONE ENCOUNTER
Patient is requesting to speak with you directly about abdominal and hip pain she is experiencing. Please advise.

## 2019-04-10 NOTE — TELEPHONE ENCOUNTER
Pt calling again with extreme pain in her left side. Pt states that she needs something for the pain. Adv pt that she should report to an ED for extreme pain. Pt states she refuses to go to [facility] and cannot drive to [facility]. Adv pt that a message was sent to Dr Us and will send another message for a return call. [phone]. Pls adv.

## 2019-04-12 ENCOUNTER — TELEPHONE (OUTPATIENT)
Dept: OBSTETRICS AND GYNECOLOGY | Facility: CLINIC | Age: 54
End: 2019-04-12

## 2019-04-12 NOTE — TELEPHONE ENCOUNTER
"Pt states she is having terrible pelvic pains, where she was cut for her surgery. Pt reports that she "needs something for the pain, I would not be calling if I wasn't hurting" Adv pt that Dr Us was sent messages to return her call. Adv pt that if her pain is this severe she should report to the nearest ER. Pt states that she feels as if she is being avoided but refused to be seen by another provider. Pt refused the adv of going to the ER. Pt states that she would like an appointment to discuss her pain until she can get into the orthopedic doctor. Pt scheduled voiced understanding   "

## 2019-04-15 ENCOUNTER — OFFICE VISIT (OUTPATIENT)
Dept: OBSTETRICS AND GYNECOLOGY | Facility: CLINIC | Age: 54
End: 2019-04-15
Payer: MEDICAID

## 2019-04-15 ENCOUNTER — TELEPHONE (OUTPATIENT)
Dept: OBSTETRICS AND GYNECOLOGY | Facility: CLINIC | Age: 54
End: 2019-04-15

## 2019-04-15 VITALS
RESPIRATION RATE: 18 BRPM | BODY MASS INDEX: 43.06 KG/M2 | DIASTOLIC BLOOD PRESSURE: 100 MMHG | SYSTOLIC BLOOD PRESSURE: 150 MMHG | HEART RATE: 86 BPM | WEIGHT: 234 LBS | HEIGHT: 62 IN

## 2019-04-15 DIAGNOSIS — M86.9 OSTEITIS PUBIS: Primary | ICD-10-CM

## 2019-04-15 PROCEDURE — 99999 PR PBB SHADOW E&M-EST. PATIENT-LVL III: CPT | Mod: PBBFAC,,, | Performed by: OBSTETRICS & GYNECOLOGY

## 2019-04-15 PROCEDURE — 99999 PR PBB SHADOW E&M-EST. PATIENT-LVL III: ICD-10-PCS | Mod: PBBFAC,,, | Performed by: OBSTETRICS & GYNECOLOGY

## 2019-04-15 PROCEDURE — 99213 OFFICE O/P EST LOW 20 MIN: CPT | Mod: S$PBB,,, | Performed by: OBSTETRICS & GYNECOLOGY

## 2019-04-15 PROCEDURE — 99213 PR OFFICE/OUTPT VISIT, EST, LEVL III, 20-29 MIN: ICD-10-PCS | Mod: S$PBB,,, | Performed by: OBSTETRICS & GYNECOLOGY

## 2019-04-15 PROCEDURE — 99213 OFFICE O/P EST LOW 20 MIN: CPT | Mod: PBBFAC | Performed by: OBSTETRICS & GYNECOLOGY

## 2019-04-15 RX ORDER — TRAMADOL HYDROCHLORIDE 50 MG/1
50 TABLET ORAL EVERY 6 HOURS PRN
Qty: 20 TABLET | Refills: 0 | Status: SHIPPED | OUTPATIENT
Start: 2019-04-15 | End: 2019-11-18

## 2019-04-15 NOTE — TELEPHONE ENCOUNTER
Pt states she cannot take Tramadol, it makes her nauseated. Adv pt that she can take OTC nausea medication but that a message would be sent for advice. Pls adv

## 2019-04-15 NOTE — PROGRESS NOTES
Subjective:    Patient ID: Yaneth Tejada is a 54 y.o.. Female.    Chief Complaint:   Chief Complaint   Patient presents with    Follow-up     9 weeks; BSO       History of Present Illness:   Yaneth presents today for follow up of suspected osteitis pubis. She continues to complain of pain and has not gotten relief with NSAIDS. Her  Pelvis X-Rays did not reveal findings compatible with osteitis. She reports continued pain, especially when rising or lifting. She denies N/V, fever, chills. Pain has only been controlled with narcotic medication when she has pain. She describes the pain as suprapubic and intense.    Pathology: Normal.      Objective:   Vital Signs:  Vitals:    04/15/19 1321   BP: (!) 150/100   Pulse: 86   Resp: 18       Physical Exam:  General:  alert,normal appearing female   Abdomen:  soft, non-tender; bowel sounds normal. Incision healing well. There is tenderness to palpation of the inguinal region across her entire abdomen. No hernias. No single point tenderness.   Pelvis: VULVA: normal. No lesions   VAGINA: normal appearing vagina with normal color and discharge, no lesions,   CERVIX: surgically absent,   UTERUS: surgically absent, vaginal cuff well healed,   ADNEXA: normal adnexa in size, nontender and no masses, surgically absent bilateral. No masses. Nontender.     Recent lab:  Lab Results   Component Value Date    WBC 10.09 02/13/2019    HGB 13.5 02/13/2019    HCT 42.6 02/13/2019    MCV 92 02/13/2019     02/13/2019       Impresssion:  Encounter Diagnosis   Name Primary?    Osteitis pubis Yes         Plan:  Osteitis pubis    Other orders  -     traMADol (ULTRAM) 50 mg tablet; Take 1 tablet (50 mg total) by mouth every 6 (six) hours as needed for Pain.  Dispense: 20 tablet; Refill: 0      I am awaiting a consult from Orthopedics at [facility].

## 2019-04-15 NOTE — TELEPHONE ENCOUNTER
Pt seen in the office today. Per Dr Anila [facility], He has looked over pts chart, states that there is nothing they can do to help her in orthopedics. He also states that he recommends sending patient to pain management.

## 2019-04-16 NOTE — TELEPHONE ENCOUNTER
----- Message from Wendy Carlin MA sent at 4/16/2019 12:21 PM CDT -----  Contact: juancarlos Tejada  MRN: [id number]  Home Phone      [phone]  Work Phone      Not on file.  Mobile          [phone]    Patient Care Team:  Cash Dempsey MD as PCP - General (Family Medicine)  Carrington Us MD as Obstetrician (Obstetrics)  OB? No  What phone number can you be reached at?   [phone]  Message:   Stated Rx was called in for Tramadol yesterday.  Stated can not take Tramadol due to makes her nauseated.  Stated did  the Tramadol and tried last night to take it, but it made her sick. Would like to know if something else can be given.    Pharmacy:  [facility]

## 2019-04-16 NOTE — TELEPHONE ENCOUNTER
Pt calling states that she picked up Tramadol that was sent even though it makes her sick. Pt states that she took a dose and she was nauseated. Adv pt over the phone to take antinausea medication OTC like Unisom. Pt requesting something else be sent in for her pain.

## 2019-05-01 ENCOUNTER — TELEPHONE (OUTPATIENT)
Dept: OBSTETRICS AND GYNECOLOGY | Facility: CLINIC | Age: 54
End: 2019-05-01

## 2019-05-01 DIAGNOSIS — R10.2 CHRONIC SUPRAPUBIC PAIN: Primary | ICD-10-CM

## 2019-05-01 DIAGNOSIS — G89.29 CHRONIC SUPRAPUBIC PAIN: Primary | ICD-10-CM

## 2019-05-01 NOTE — TELEPHONE ENCOUNTER
----- Message from Soledad Hernandez sent at 5/1/2019  9:15 AM CDT -----  Contact: Scott Tejada  MRN: [id number]  Home Phone      [phone]  Work Phone      Not on file.  Mobile          [phone]    Patient Care Team:  Cash Dempsey MD as PCP - General (Family Medicine)  Carrington Us MD as Obstetrician (Obstetrics)  OB? No  What phone number can you be reached at? [phone]  Message:   Pt would like to touch base with a nurse about her pelvic pain. Please advise.

## 2019-05-01 NOTE — PROGRESS NOTES
I spoke with the orthopedist at [facility]. He feels that benito needs to be in pain clinic as there are no signs of significant osteitis on her X-Rays or CT scan. I will forward to pain clinic for evaluation and treatment.

## 2019-05-01 NOTE — TELEPHONE ENCOUNTER
Soledad MOON [facility] Ob[facility]yn Clinical Staff   Caller: Self (Today,  9:15 AM)             Yaneth Tejada   MRN: [id number]   Home Phone      [phone]   Work Phone      Not on file.   Mobile          [phone]     Patient Care Team:   Cash Dempsey MD as PCP - General (Family Medicine)   Carrington Us MD as Obstetrician (Obstetrics)   OB? No   What phone number can you be reached at? [phone]   Message:   Pt would like to touch base with a nurse about her pelvic pain. Please advise.

## 2019-05-21 RX ORDER — CLOTRIMAZOLE AND BETAMETHASONE DIPROPIONATE 10; .64 MG/G; MG/G
CREAM TOPICAL 2 TIMES DAILY
Qty: 15 G | Refills: 1 | Status: SHIPPED | OUTPATIENT
Start: 2019-05-21 | End: 2019-06-20

## 2019-05-21 NOTE — TELEPHONE ENCOUNTER
----- Message from Saskia Marx MA sent at 5/21/2019 10:07 AM CDT -----  Contact: Self      ----- Message -----  From: Ave Hewitt  Sent: 5/21/2019  10:01 AM  To: Abeba Wallace    Yaneth Tejada  MRN: [id number]  Home Phone      [phone]  Work Phone      Not on file.  Mobile          [phone]    Patient Care Team:  Cash Dempsey MD as PCP - General (Family Medicine)  Carrington Us MD as Obstetrician (Obstetrics)  OB? No  What phone number can you be reached at? [phone]  Message:   Pt states she has bumps on her vagina that itches, she states she also is very emotional and would like to know if she should get on hormone medication. Please return call.

## 2019-05-21 NOTE — TELEPHONE ENCOUNTER
Patient states that she is having hot flashes and very emotional. She states that she was having so much pain after surgery that they did not discuss hormone replacement therapy. Patient states that she feels like she needs to start medication for HRT. Patient requesting a prescription be sent to [facility] pharmacy in [place].    She is also requesting refill on clotrimazole-betamethasone 1-0.05% cream. Last office visit 4/15/19.    She also states that she has not heard anything on referral to pain management. Pain management referral, demographics, insurance card and office notes refaxed to Dr. Kenny Hammond Jr office at [phone] with fax confirmation received.    Patient aware that Dr Us is not in clinic today.

## 2019-05-22 RX ORDER — ESTRADIOL 1 MG/1
1 TABLET ORAL DAILY
Qty: 30 TABLET | Refills: 12 | Status: SHIPPED | OUTPATIENT
Start: 2019-05-22 | End: 2021-10-04

## 2019-05-22 NOTE — TELEPHONE ENCOUNTER
----- Message from Saskia Dee sent at 5/22/2019  1:51 PM CDT -----      ----- Message -----  From: Tiffanie Vanegas MA  Sent: 5/22/2019   1:33 PM  To: Abeba Wallace    Good Afternoon    In regards to Ms. Tejada, we received an referral to Dr. Hammond @ Pain Management to address patient's diagnosis of chronic suprapubic pain. I apologize, at this time Dr. Hammond no longer accepts Medicaid as a insurance type. Please refer patient to a physician that would be able to treat her pain needs.

## 2019-05-22 NOTE — TELEPHONE ENCOUNTER
Attempted to contact patient. Left message to return call to clinic. No pain management at [facility]. Per Dr. Us, would need to refer to [place] or [place] for pain management.

## 2019-05-22 NOTE — TELEPHONE ENCOUNTER
Please notify Yaneth that I sent a prescription for hormones to her pharmacy. Take one daily. She should see improvement in flushing in about 1-2 weeks.

## 2019-05-22 NOTE — TELEPHONE ENCOUNTER
Tiffanie with Dr. Hammond at the Pain Management clinic contacted us to inform that Dr. Hammond no longer accepts Medicaid. Please advise.

## 2019-05-23 NOTE — TELEPHONE ENCOUNTER
Notified Mrs Wolfe the pain clinics do not accept her insurance. Pt will contact insurance company to see what providers are covered under her plan and will contact us for the referral.

## 2019-07-17 ENCOUNTER — OFFICE VISIT (OUTPATIENT)
Dept: OBSTETRICS AND GYNECOLOGY | Facility: CLINIC | Age: 54
End: 2019-07-17
Payer: MEDICAID

## 2019-07-17 VITALS
RESPIRATION RATE: 18 BRPM | HEART RATE: 86 BPM | BODY MASS INDEX: 42.51 KG/M2 | DIASTOLIC BLOOD PRESSURE: 90 MMHG | HEIGHT: 62 IN | WEIGHT: 231 LBS | SYSTOLIC BLOOD PRESSURE: 136 MMHG

## 2019-07-17 DIAGNOSIS — L30.9 DERMATITIS: Primary | ICD-10-CM

## 2019-07-17 PROCEDURE — 99999 PR PBB SHADOW E&M-EST. PATIENT-LVL III: ICD-10-PCS | Mod: PBBFAC,,, | Performed by: OBSTETRICS & GYNECOLOGY

## 2019-07-17 PROCEDURE — 99213 PR OFFICE/OUTPT VISIT, EST, LEVL III, 20-29 MIN: ICD-10-PCS | Mod: S$PBB,,, | Performed by: OBSTETRICS & GYNECOLOGY

## 2019-07-17 PROCEDURE — 99213 OFFICE O/P EST LOW 20 MIN: CPT | Mod: S$PBB,,, | Performed by: OBSTETRICS & GYNECOLOGY

## 2019-07-17 PROCEDURE — 99999 PR PBB SHADOW E&M-EST. PATIENT-LVL III: CPT | Mod: PBBFAC,,, | Performed by: OBSTETRICS & GYNECOLOGY

## 2019-07-17 PROCEDURE — 99213 OFFICE O/P EST LOW 20 MIN: CPT | Mod: PBBFAC | Performed by: OBSTETRICS & GYNECOLOGY

## 2019-07-17 RX ORDER — CLOTRIMAZOLE AND BETAMETHASONE DIPROPIONATE 10; .64 MG/G; MG/G
CREAM TOPICAL 2 TIMES DAILY
Qty: 15 G | Refills: 1 | Status: SHIPPED | OUTPATIENT
Start: 2019-07-17 | End: 2019-08-14 | Stop reason: SDUPTHER

## 2019-07-17 NOTE — PROGRESS NOTES
Subjective:    Patient ID: Yaneth Tejada is a 54 y.o. y.o. female    Chief Complaint:   Chief Complaint   Patient presents with    Rash     x 3 weeks    Urinary Incontinence     x 1 week        History of Present Illness:  Yaneth presents today for evaluation of a rash in her groin. She states she has been swimming a lot and has a rash where her suit passes. She complain of itching also.      Review of Systems   Constitutional: Negative for activity change, appetite change, chills, diaphoresis, fatigue, fever and unexpected weight change.   Respiratory: Negative for cough, shortness of breath and wheezing.    Cardiovascular: Negative for chest pain, palpitations and leg swelling.   Gastrointestinal: Negative for abdominal pain, blood in stool, constipation, diarrhea, nausea and vomiting.   Endocrine: Negative for diabetes, hair loss, hot flashes, hyperthyroidism and hypothyroidism.   Genitourinary: Positive for frequency. Negative for decreased libido, dyspareunia, dysuria, flank pain, genital sores, hematuria, menorrhagia, menstrual problem, pelvic pain, urgency, vaginal bleeding, vaginal discharge, vaginal pain, urinary incontinence, postcoital bleeding and vaginal odor.   Integumentary:  Positive for rash. Negative for nipple discharge.   Hematological: Negative for adenopathy. Does not bruise/bleed easily.   Psychiatric/Behavioral: Negative for sleep disturbance. The patient is not nervous/anxious.    Breast: Negative for mastodynia and nipple discharge          Objective:    Vital Signs:  Vitals:    07/17/19 1417   BP: (!) 136/90   Pulse: 86   Resp: 18       Physical Exam:  General:  alert,normal appearing gravid female   Skin:  Skin color, texture, turgor normal. No rashes or lesions   Abdomen: soft, non-tender. Bowel sounds normal. No masses,  no organomegaly   Pelvis: External genitalia: normal general appearance, moderatte dermatitis in intertrigenous areas.  Urinary system: urethral meatus normal, bladder  nontender  Vaginal: normal mucosa without prolapse or lesions  Cervix: normal appearance  Uterus: normal single, nontender  Adnexa: normal bimanual exam         Assessment:      1. Dermatitis          Plan:      Dermatitis    Other orders  -     clotrimazole-betamethasone 1-0.05% (LOTRISONE) cream; Apply topically 2 (two) times daily.  Dispense: 15 g; Refill: 1

## 2019-07-31 ENCOUNTER — HOSPITAL ENCOUNTER (OUTPATIENT)
Dept: RADIOLOGY | Facility: HOSPITAL | Age: 54
Discharge: HOME | End: 2019-07-31
Attending: OBSTETRICS & GYNECOLOGY
Payer: MEDICAID

## 2019-07-31 ENCOUNTER — TELEPHONE (OUTPATIENT)
Dept: OBSTETRICS AND GYNECOLOGY | Facility: CLINIC | Age: 54
End: 2019-07-31

## 2019-07-31 VITALS — BODY MASS INDEX: 42.51 KG/M2 | WEIGHT: 231 LBS | HEIGHT: 62 IN

## 2019-07-31 DIAGNOSIS — Z12.31 ENCOUNTER FOR SCREENING MAMMOGRAM FOR BREAST CANCER: ICD-10-CM

## 2019-07-31 DIAGNOSIS — Z12.31 ENCOUNTER FOR SCREENING MAMMOGRAM FOR BREAST CANCER: Primary | ICD-10-CM

## 2019-07-31 PROCEDURE — 77063 BREAST TOMOSYNTHESIS BI: CPT | Mod: 26,,, | Performed by: RADIOLOGY

## 2019-07-31 PROCEDURE — 77067 SCR MAMMO BI INCL CAD: CPT | Mod: TC

## 2019-07-31 PROCEDURE — 77063 MAMMO DIGITAL SCREENING BILAT WITH TOMOSYNTHESIS_CAD: ICD-10-PCS | Mod: 26,,, | Performed by: RADIOLOGY

## 2019-07-31 PROCEDURE — 77067 SCR MAMMO BI INCL CAD: CPT | Mod: 26,,, | Performed by: RADIOLOGY

## 2019-07-31 PROCEDURE — 77067 MAMMO DIGITAL SCREENING BILAT WITH TOMOSYNTHESIS_CAD: ICD-10-PCS | Mod: 26,,, | Performed by: RADIOLOGY

## 2019-08-14 RX ORDER — CLOTRIMAZOLE AND BETAMETHASONE DIPROPIONATE 10; .64 MG/G; MG/G
CREAM TOPICAL 2 TIMES DAILY
Qty: 15 G | Refills: 1 | Status: SHIPPED | OUTPATIENT
Start: 2019-08-14 | End: 2019-09-13

## 2019-08-14 NOTE — TELEPHONE ENCOUNTER
----- Message from Yovana Gloria MA sent at 8/14/2019 10:04 AM CDT -----  Contact: self      ----- Message -----  From: Wendy Carlin MA  Sent: 8/14/2019   9:58 AM  To: Abeba LOPEZ Staff Yaneth Tejada  MRN: [id number]  Home Phone      [phone]  Work Phone      Not on file.  Mobile          [phone]    Patient Care Team:  Cash Dempsey MD as PCP - General (Family Medicine)  Carrington Us MD as Obstetrician (Obstetrics)  OB? No  What phone number can you be reached at?   [phone]  Message:   Needs refill on Clotrimazole   Pharmacy:  [facility]

## 2019-08-14 NOTE — TELEPHONE ENCOUNTER
Yaneth desire refill of Lotrisone. Annual scheduled. Please advise.   Patient Active Problem List   Diagnosis    Pelvic pain in female    Ovarian cyst, right     Prior to Admission medications    Medication Sig Start Date End Date Taking? Authorizing Provider   clonazePAM (KLONOPIN) 0.5 MG tablet Take 0.5 mg by mouth daily as needed. 7/11/17   Historical Provider, MD   clotrimazole-betamethasone 1-0.05% (LOTRISONE) cream Apply topically 2 (two) times daily. 7/17/19 8/16/19  Carrington Us MD   estradiol (ESTRACE) 1 MG tablet Take 1 tablet (1 mg total) by mouth once daily. 5/22/19 5/21/20  Carrington Us MD   FLUoxetine 20 MG capsule Take 20 mg by mouth once daily. 11/2/18   Historical Provider, MD   losartan-hydrochlorothiazide 100-12.5 mg (HYZAAR) 100-12.5 mg Tab Take 1 tablet by mouth once daily. 11/28/18   Historical Provider, MD   meloxicam (MOBIC) 15 MG tablet Take 1 tablet (15 mg total) by mouth once daily. 3/20/19   Carrington Us MD   traMADol (ULTRAM) 50 mg tablet Take 1 tablet (50 mg total) by mouth every 6 (six) hours as needed for Pain. 4/15/19 4/14/20  Carrington Us MD

## 2019-08-30 ENCOUNTER — TELEPHONE (OUTPATIENT)
Dept: OBSTETRICS AND GYNECOLOGY | Facility: CLINIC | Age: 54
End: 2019-08-30

## 2019-08-30 NOTE — TELEPHONE ENCOUNTER
Pt called states she has been feeling depressed the last few days and crying a lot. Pt denies any suicidal thoughts. Not currently on any type of depression medication. I contacted patients PCP, Dr. Dempsey and was able to schedule patient appointment today for evaluation. Pt notified. Instructed pt to contact me after appointment to discuss the treatment they chose. Pt voiced understanding. Pt also requesting appointment with Dr. Us. Appt scheduled next week. Pt verbalized understanding and states she will contact me after she sees pcp this afternoon.

## 2019-08-30 NOTE — TELEPHONE ENCOUNTER
----- Message from Yovana Gloria MA sent at 8/30/2019 10:42 AM CDT -----  Contact: Self      ----- Message -----  From: Ave Hewitt  Sent: 8/30/2019  10:37 AM  To: Abeba LOPEZ Staff Yaneth Tejada  MRN: [id number]  Home Phone      [phone]  Work Phone      Not on file.  Mobile          [phone]    Patient Care Team:  Cash Dempsey MD as PCP - General (Family Medicine)  Carrington Us MD as Obstetrician (Obstetrics)  OB? No  What phone number can you be reached at? [phone]  Message:   Pt states she is really depressed she states some days she has good days and other days are bad and she states today is one of those bad days. She states that she cannot stop crying the medication that was working is no longer working for her. Please return call.

## 2019-09-03 ENCOUNTER — TELEPHONE (OUTPATIENT)
Dept: OBSTETRICS AND GYNECOLOGY | Facility: CLINIC | Age: 54
End: 2019-09-03

## 2019-09-03 NOTE — TELEPHONE ENCOUNTER
----- Message from Yovana Gloria MA sent at 9/3/2019 12:43 PM CDT -----  Contact: self      ----- Message -----  From: Ave Hewitt  Sent: 9/3/2019  12:26 PM  To: Abeba LOPEZ Staff Yaneth Tejada  MRN: [id number]  Home Phone      [phone]  Work Phone      Not on file.  Mobile          [phone]    Patient Care Team:  Cash Dempsey MD as PCP - General (Family Medicine)  Carrington Us MD as Obstetrician (Obstetrics)  OB? No  What phone number can you be reached at? [phone]  Message:   Pt would like to r/s her appt tomorrow to 9/9,  Pt is requesting to speak to Danya. Please return call.

## 2019-09-03 NOTE — TELEPHONE ENCOUNTER
Pt states saw Dr. Maradiaga and was started on medication. Reports feeling much better now. Unable to make appt on 9/4/19, but would like to reschedule. Appt rescheduled to 9/9/19.

## 2019-09-05 ENCOUNTER — TELEPHONE (OUTPATIENT)
Dept: OBSTETRICS AND GYNECOLOGY | Facility: CLINIC | Age: 54
End: 2019-09-05

## 2019-09-05 NOTE — TELEPHONE ENCOUNTER
----- Message from Yovana Gloria MA sent at 9/5/2019 10:33 AM CDT -----  Contact: Karla Perkins      ----- Message -----  From: Wendy Carlin MA  Sent: 9/5/2019  10:24 AM  To: Abeba LOPEZ Staff Yaneth Tejada  MRN: [id number]  Home Phone      [phone]  Work Phone      Not on file.  [employer]          [phone]    Patient Care Team:  Cash Dempsey MD as PCP - General (Family Medicine)  Carrington Us MD as Obstetrician (Obstetrics)  OB? No  What phone number can you be reached at?   [phone]  Message:   Needs to know directions for clotrimazole-betamethasone Rx that was sent in.

## 2019-09-05 NOTE — TELEPHONE ENCOUNTER
Karla from [facility] states that she needs to know where cream is being applied to for insurance. Instructed that it is to be applied to the groin.

## 2019-09-09 ENCOUNTER — TELEPHONE (OUTPATIENT)
Dept: OBSTETRICS AND GYNECOLOGY | Facility: CLINIC | Age: 54
End: 2019-09-09

## 2019-09-09 ENCOUNTER — OFFICE VISIT (OUTPATIENT)
Dept: OBSTETRICS AND GYNECOLOGY | Facility: CLINIC | Age: 54
End: 2019-09-09
Payer: MEDICAID

## 2019-09-09 VITALS
WEIGHT: 237 LBS | DIASTOLIC BLOOD PRESSURE: 100 MMHG | HEIGHT: 62 IN | SYSTOLIC BLOOD PRESSURE: 160 MMHG | BODY MASS INDEX: 43.61 KG/M2 | RESPIRATION RATE: 18 BRPM | HEART RATE: 94 BPM

## 2019-09-09 DIAGNOSIS — L29.9 ITCHING: ICD-10-CM

## 2019-09-09 DIAGNOSIS — N39.41 URGE INCONTINENCE: ICD-10-CM

## 2019-09-09 DIAGNOSIS — L73.9 FOLLICULITIS: Primary | ICD-10-CM

## 2019-09-09 PROCEDURE — 99999 PR PBB SHADOW E&M-EST. PATIENT-LVL III: CPT | Mod: PBBFAC,,, | Performed by: OBSTETRICS & GYNECOLOGY

## 2019-09-09 PROCEDURE — 99213 PR OFFICE/OUTPT VISIT, EST, LEVL III, 20-29 MIN: ICD-10-PCS | Mod: S$PBB,,, | Performed by: OBSTETRICS & GYNECOLOGY

## 2019-09-09 PROCEDURE — 99213 OFFICE O/P EST LOW 20 MIN: CPT | Mod: S$PBB,,, | Performed by: OBSTETRICS & GYNECOLOGY

## 2019-09-09 PROCEDURE — 99999 PR PBB SHADOW E&M-EST. PATIENT-LVL III: ICD-10-PCS | Mod: PBBFAC,,, | Performed by: OBSTETRICS & GYNECOLOGY

## 2019-09-09 PROCEDURE — 99213 OFFICE O/P EST LOW 20 MIN: CPT | Mod: PBBFAC | Performed by: OBSTETRICS & GYNECOLOGY

## 2019-09-09 RX ORDER — CLINDAMYCIN PHOSPHATE 11.9 MG/ML
SOLUTION TOPICAL
Qty: 60 ML | Refills: 0 | Status: SHIPPED | OUTPATIENT
Start: 2019-09-09 | End: 2019-09-12

## 2019-09-09 RX ORDER — CITALOPRAM 10 MG/1
TABLET ORAL
COMMUNITY

## 2019-09-09 RX ORDER — TOLTERODINE 4 MG/1
4 CAPSULE, EXTENDED RELEASE ORAL DAILY
Qty: 30 CAPSULE | Refills: 2 | Status: SHIPPED | OUTPATIENT
Start: 2019-09-09 | End: 2019-09-12

## 2019-09-09 RX ORDER — HYDROXYZINE PAMOATE 25 MG/1
25 CAPSULE ORAL 2 TIMES DAILY
Qty: 30 CAPSULE | Refills: 0 | Status: SHIPPED | OUTPATIENT
Start: 2019-09-09 | End: 2019-10-21 | Stop reason: SDUPTHER

## 2019-09-09 RX ORDER — ALPRAZOLAM 0.5 MG/1
TABLET ORAL
COMMUNITY
End: 2023-06-27

## 2019-09-09 NOTE — PROGRESS NOTES
Dictation #1  MRN:[id number]  [facility]:[id number]  Subjective:    Patient ID: Yaneth Tejada is a 54 y.o. y.o. female    Chief Complaint:   Chief Complaint   Patient presents with    Follow-up     Rash in groin and legs       History of Present Illness:  Yaneth presents today for evaluation of a rash and itching. She states she has a rash in her groin and on her buttocks that itches. She has noted that it is much worse after swimming in a chlorinated pool. She denies rash in other areas, but states occasionally she itches all over. She also reports urgency and incontinence lately. She has both urge and stress incontinence and states it is getting worse.    Review of Systems   Constitutional: Negative for activity change, appetite change, chills, diaphoresis, fatigue, fever and unexpected weight change.   Respiratory: Negative for cough, shortness of breath and wheezing.    Cardiovascular: Negative for chest pain, palpitations and leg swelling.   Gastrointestinal: Negative for abdominal pain, blood in stool, constipation, diarrhea, nausea and vomiting.   Endocrine: Negative for diabetes, hair loss, hot flashes, hyperthyroidism and hypothyroidism.   Genitourinary: Negative for decreased libido, dyspareunia, dysuria, flank pain, frequency, genital sores, hematuria, menorrhagia, menstrual problem, pelvic pain, urgency, vaginal bleeding, vaginal discharge, vaginal pain, urinary incontinence, postcoital bleeding and vaginal odor.   Integumentary:  Positive for rash. Negative for nipple discharge.        Generalized itching   Hematological: Negative for adenopathy. Does not bruise/bleed easily.   Psychiatric/Behavioral: Negative for sleep disturbance. The patient is not nervous/anxious.    Breast: Negative for mastodynia and nipple discharge          Objective:    Vital Signs:  Vitals:    09/09/19 0925   BP: (!) 160/100   Pulse: 94   Resp: 18       Physical Exam:  General:  alert,normal appearing gravid female   Skin:  punctate rash  over buttocks and groin involving hair follicles. No rurulence.   Abdomen: soft, non-tender. Bowel sounds normal. No masses,  no organomegaly   Pelvis:  Pelvic - normal external genitalia, vulva, vagina, cervix, uterus and adnexa, VULVA: punctate rash over mons.,   VAGINA: normal appearing vagina with normal color and discharge, no lesions, bladder appears well supported  CERVIX: surgically absent,   UTERUS: surgically absent, vaginal cuff well healed,   ADNEXA: no masses           Assessment:      1. Folliculitis    2. Itching    3. Urge incontinence          Plan:      Folliculitis  -     clindamycin (CLEOCIN T) 1 % external solution; Apply to affected area 2 times daily  Dispense: 60 mL; Refill: 0    Itching  -     hydrOXYzine pamoate (VISTARIL) 25 MG Cap; Take 1 capsule (25 mg total) by mouth 2 (two) times daily.  Dispense: 30 capsule; Refill: 0    Urge incontinence  -     tolterodine (DETROL LA) 4 MG 24 hr capsule; Take 1 capsule (4 mg total) by mouth once daily.  Dispense: 30 capsule; Refill: 2

## 2019-09-09 NOTE — TELEPHONE ENCOUNTER
PA initiated via cover my meds for Cleocin (clindamycin) 1% solution. Key: [id number] - PA Case ID: [id number], awaiting response. Cash pay price is $103.81

## 2019-09-09 NOTE — TELEPHONE ENCOUNTER
PA initiated via cover my meds for Toterodine Tartrate ER 4MG capsules. Key:  [id number] - PA Case ID: [id number], awaiting response.

## 2019-09-11 ENCOUNTER — TELEPHONE (OUTPATIENT)
Dept: OBSTETRICS AND GYNECOLOGY | Facility: CLINIC | Age: 54
End: 2019-09-11

## 2019-09-11 NOTE — TELEPHONE ENCOUNTER
----- Message from Saskia Marx MA sent at 9/11/2019 11:36 AM CDT -----  Contact: self      ----- Message -----  From: Wendy Carlin MA  Sent: 9/11/2019  11:32 AM  To: Abeba LOPEZ Staff Yaneth Tejada  MRN: [id number]  Home Phone      [phone]  Work Phone      Not on file.  Mobile          [phone]    Patient Care Team:  Cash Dempsey MD as PCP - General (Family Medicine)  Carrington Us MD as Obstetrician (Obstetrics)  OB? No  What phone number can you be reached at?  [phone]     Message:   Needs to speak to nurse regarding PA for tolterodine and clindamycin.

## 2019-09-11 NOTE — TELEPHONE ENCOUNTER
Pt requesting Dr. Us to call something else in. She is not able to afford the medication (Cleocin and Tolerodine). Please advise

## 2019-09-12 ENCOUNTER — TELEPHONE (OUTPATIENT)
Dept: OBSTETRICS AND GYNECOLOGY | Facility: CLINIC | Age: 54
End: 2019-09-12

## 2019-09-12 RX ORDER — ERYTHROMYCIN 20 MG/G
GEL TOPICAL DAILY
Qty: 30 G | Refills: 1 | Status: SHIPPED | OUTPATIENT
Start: 2019-09-12

## 2019-09-12 RX ORDER — OXYBUTYNIN CHLORIDE 10 MG/1
10 TABLET, EXTENDED RELEASE ORAL DAILY
Qty: 30 TABLET | Refills: 5 | Status: SHIPPED | OUTPATIENT
Start: 2019-09-12 | End: 2021-10-04

## 2019-09-12 NOTE — TELEPHONE ENCOUNTER
----- Message from Saskia Marx MA sent at 9/12/2019  4:45 PM CDT -----  Contact: Self      ----- Message -----  From: Ave Hewitt  Sent: 9/12/2019   4:35 PM  To: Abeba Wallace    Yaneth Tejada  MRN: [id number]  Home Phone      [phone]  Work Phone      Not on file.  Mobile          [phone]    Patient Care Team:  Cash Dempsey MD as PCP - General (Family Medicine)  Carrington Us MD as Obstetrician (Obstetrics)  OB? No  What phone number can you be reached at? [phone]  Message:   Pt states that [facility] informed her that her medication is not covered by her insurance, she needs something equivalent called in. Please return call.   
Pt states gel that was called in is not covered by insurance. Instructed pt she would need to contact insurance to see what would be covered in this category. Pt voiced understanding.   
[employer]

## 2019-09-12 NOTE — TELEPHONE ENCOUNTER
PA on Clindamycin sol 1% denied. PA for Tolterodine cap 4mg declined also, states patient should try and fail at least one preferred drug. Preferred drugs:Oxybutynin, Toviaz, Vesicare.

## 2019-10-21 DIAGNOSIS — L29.9 ITCHING: ICD-10-CM

## 2019-10-21 RX ORDER — HYDROXYZINE PAMOATE 25 MG/1
25 CAPSULE ORAL 2 TIMES DAILY
Qty: 30 CAPSULE | Refills: 2 | Status: SHIPPED | OUTPATIENT
Start: 2019-10-21 | End: 2021-10-04

## 2019-10-21 NOTE — TELEPHONE ENCOUNTER
----- Message from Saskia Marx MA sent at 10/21/2019 12:42 PM CDT -----  Contact: self      ----- Message -----  From: Wendy Carlin MA  Sent: 10/21/2019  11:26 AM CDT  To: Abeba Wallace    Yaneth Tejada  MRN: [id number]  Home Phone      [phone]  Work Phone      Not on file.  Mobile          [phone]    Patient Care Team:  Cash Dempsye MD as PCP - General (Family Medicine)  Carrington Us MD as Obstetrician (Obstetrics)  OB? No  What phone number can you be reached at?  [phone]  Message:   Would like something called in for rash on thigh.   Pharmacy:  [facility]

## 2019-10-21 NOTE — TELEPHONE ENCOUNTER
Rash in between legs and on back of legs. Itches with little red small bumps. States she continues to get this rash wants visteral refill and states she tried ketoconazole did not work the triamcinolone worked some  But needs big jar. The erythromycin with ethanol and the cleocin T patient not able to get not covered by insurance.please advise   Rx: [facility]     LV: 9/9/18

## 2019-10-22 ENCOUNTER — TELEPHONE (OUTPATIENT)
Dept: OBSTETRICS AND GYNECOLOGY | Facility: CLINIC | Age: 54
End: 2019-10-22

## 2019-10-22 NOTE — TELEPHONE ENCOUNTER
----- Message from Saskia Marx MA sent at 10/22/2019  9:32 AM CDT -----  Contact: Self      ----- Message -----  From: Ave Hewitt  Sent: 10/22/2019   9:24 AM CDT  To: Abeba LOPEZ Staff Yaneth Tejada  MRN: [id number]  Home Phone      [phone]  Work Phone      Not on file.  Mobile          [phone]    Patient Care Team:  Cash Dempsey MD as PCP - General (Family Medicine)  Carrington Us MD as Obstetrician (Obstetrics)  OB? No  What phone number can you be reached at? [phone]  Message:   Pt would like a refill on Clotrimazole BETAMETHASONE DIPROPIONATE cream called in to [facility] pharmacy. Please return call.

## 2019-10-22 NOTE — TELEPHONE ENCOUNTER
If her PCP prescribed the medication, please have her reach out to the prescribing physician to change the size of given amount.

## 2019-10-22 NOTE — TELEPHONE ENCOUNTER
Pt is requesting betamethasone cream she wants to use this on rash on back of her legs states her pcp gave her a small tube wants a large tube/ jar.please advise

## 2019-10-28 ENCOUNTER — OFFICE VISIT (OUTPATIENT)
Dept: OBSTETRICS AND GYNECOLOGY | Facility: CLINIC | Age: 54
End: 2019-10-28
Payer: MEDICAID

## 2019-10-28 ENCOUNTER — TELEPHONE (OUTPATIENT)
Dept: OBSTETRICS AND GYNECOLOGY | Facility: CLINIC | Age: 54
End: 2019-10-28

## 2019-10-28 VITALS
BODY MASS INDEX: 43.43 KG/M2 | DIASTOLIC BLOOD PRESSURE: 100 MMHG | WEIGHT: 236 LBS | RESPIRATION RATE: 18 BRPM | HEIGHT: 62 IN | SYSTOLIC BLOOD PRESSURE: 160 MMHG | HEART RATE: 86 BPM

## 2019-10-28 DIAGNOSIS — N95.1 MENOPAUSAL STATE: ICD-10-CM

## 2019-10-28 DIAGNOSIS — Z01.419 WELL WOMAN EXAM WITH ROUTINE GYNECOLOGICAL EXAM: Primary | ICD-10-CM

## 2019-10-28 DIAGNOSIS — R21 RASH: ICD-10-CM

## 2019-10-28 PROCEDURE — 99396 PR PREVENTIVE VISIT,EST,40-64: ICD-10-PCS | Mod: S$PBB,,, | Performed by: OBSTETRICS & GYNECOLOGY

## 2019-10-28 PROCEDURE — 99396 PREV VISIT EST AGE 40-64: CPT | Mod: S$PBB,,, | Performed by: OBSTETRICS & GYNECOLOGY

## 2019-10-28 PROCEDURE — 99999 PR PBB SHADOW E&M-EST. PATIENT-LVL III: CPT | Mod: PBBFAC,,, | Performed by: OBSTETRICS & GYNECOLOGY

## 2019-10-28 PROCEDURE — 99999 PR PBB SHADOW E&M-EST. PATIENT-LVL III: ICD-10-PCS | Mod: PBBFAC,,, | Performed by: OBSTETRICS & GYNECOLOGY

## 2019-10-28 PROCEDURE — 99213 OFFICE O/P EST LOW 20 MIN: CPT | Mod: PBBFAC | Performed by: OBSTETRICS & GYNECOLOGY

## 2019-10-28 RX ORDER — LOSARTAN POTASSIUM AND HYDROCHLOROTHIAZIDE 25; 100 MG/1; MG/1
1 TABLET ORAL DAILY
Refills: 0 | COMMUNITY
Start: 2019-10-10

## 2019-10-28 RX ORDER — HYDROCODONE BITARTRATE AND ACETAMINOPHEN 5; 325 MG/1; MG/1
TABLET ORAL
Refills: 0 | COMMUNITY
Start: 2019-10-08 | End: 2019-10-28 | Stop reason: SDUPTHER

## 2019-10-28 RX ORDER — HYDROCODONE BITARTRATE AND ACETAMINOPHEN 5; 325 MG/1; MG/1
1 TABLET ORAL EVERY 6 HOURS PRN
Qty: 30 TABLET | Refills: 0 | Status: SHIPPED | OUTPATIENT
Start: 2019-10-28 | End: 2019-10-28 | Stop reason: SDUPTHER

## 2019-10-28 RX ORDER — HYDROCODONE BITARTRATE AND ACETAMINOPHEN 5; 325 MG/1; MG/1
1 TABLET ORAL EVERY 6 HOURS PRN
Qty: 30 TABLET | Refills: 0 | Status: SHIPPED | OUTPATIENT
Start: 2019-10-28 | End: 2019-12-11 | Stop reason: SDUPTHER

## 2019-10-28 NOTE — PROGRESS NOTES
Subjective:    Patient ID: Yaneth Tejada is a 54 y.o. female.     Chief Complaint: Annual Well Woman Exam     History of Present Illness:  Yaneth presents today for Annual Well Woman exam. .No LMP recorded. Patient has had a hysterectomy.. She is currently using Estradiol and she reports no problems with hot flashes, night sweats, irritability and vaginal dryness. She denies breast tenderness, denies masses, denies nipple discharge. She denies difficulty with urination. Bowel movements have not significantly changed. She complains of a rash over her abdomen and upper legs which has been present for a few months. She has pain I her left ankle following tendon tears recently. She can't see anyone until 19    Menstrual History:   No LMP recorded. Patient has had a hysterectomy.    OB History        2    Para   2    Term   2            AB        Living           SAB        TAB        Ectopic        Multiple        Live Births                     Review of Systems   Constitutional: Negative for activity change, appetite change, chills, diaphoresis, fatigue, fever and unexpected weight change.   HENT: Negative for mouth sores and tinnitus.    Eyes: Negative for discharge and visual disturbance.   Respiratory: Negative for cough, shortness of breath and wheezing.    Cardiovascular: Negative for chest pain, palpitations and leg swelling.   Gastrointestinal: Negative for abdominal pain, blood in stool, constipation, diarrhea, nausea and vomiting.   Endocrine: Negative for diabetes, hair loss, hot flashes, hyperthyroidism and hypothyroidism.   Genitourinary: Negative for decreased libido, dyspareunia, dysuria, flank pain, frequency, genital sores, hematuria, menorrhagia, menstrual problem, pelvic pain, urgency, vaginal bleeding, vaginal discharge, vaginal pain, urinary incontinence, postcoital bleeding and vaginal odor.   Musculoskeletal: Positive for arthralgias. Negative for back pain, joint swelling and  myalgias.   Integumentary:  Positive for rash. Negative for acne, hair changes and nipple discharge.   Neurological: Negative for seizures, syncope, numbness and headaches.   Hematological: Negative for adenopathy. Does not bruise/bleed easily.   Psychiatric/Behavioral: Negative for sleep disturbance. The patient is not nervous/anxious.    Breast: Negative for mastodynia and nipple discharge        Objective:    Vital Signs:  Vitals:    10/28/19 1401   BP: (!) 160/100   Pulse: 86   Resp: 18       Physical Exam:  General:  alert,normal appearing gravid female   Skin:  Skin color, texture, turgor normal. No rashes or lesions   HEENT:  conjunctivae/corneas clear. PERRL.   Neck: supple, trachea midline, no adenopathy or thyromegally   Respiratory:  clear to auscultation bilaterally   Heart:  regular rate and rhythm, S1, S2 normal, no murmur, click, rub or gallop   Breasts:  Nipples are protruding and have no nipple discharge. No palpable masses, erythema, skin changes, tenderness, or adenopathy.   Abdomen:  soft, non-tender. Bowel sounds normal. No masses,  no organomegaly   Pelvis: External genitalia: normal general appearance  Urinary system: urethral meatus normal, bladder nontender  Vaginal: normal mucosa without prolapse or lesions  Cervix: removed surgically  Uterus: removed surgically  Adnexa: removed surgically   Extremities: Normal ROM; no edema, no cyanosis   Neurologial: Normal strength and tone. No focal numbness or weakness. Reflexes 2+ and equal.   Psychiatric: normal mood, speech, dress, and thought processes         Assessment:      1. Well woman exam with routine gynecological exam    2. Rash    3. Menopausal state          Plan:      Well woman exam with routine gynecological exam    Rash  -     Ambulatory referral to Dermatology    Menopausal state    Other orders  -     HYDROcodone-acetaminophen (NORCO) 5-325 mg per tablet; Take 1 tablet by mouth every 6 (six) hours as needed for Pain.  Dispense: 30  tablet; Refill: 0        COUNSELING:  Yaneth was counseled on A.C.O.G. Pap guidelines and recommendations for yearly pelvic exams in addition to recommendations for yearly mammograms and monthly self breast exams. In addition she was counseled on adequate intake of calcium and vitamin D; to see her PCP for other health maintenance.

## 2019-10-28 NOTE — TELEPHONE ENCOUNTER
States pain medication went to the wrong pharmacy. Would like medication send to [facility]. Refill went to [facility]. Rx canceled at [facility].

## 2019-11-07 ENCOUNTER — TELEPHONE (OUTPATIENT)
Dept: OBSTETRICS AND GYNECOLOGY | Facility: CLINIC | Age: 54
End: 2019-11-07

## 2019-11-07 NOTE — TELEPHONE ENCOUNTER
Dermatology referral, demographics, insurance card and office notes faxed to Dr. Dr MERI Lazcano's office at [phone] with fax confirmation received. They will contact patient to schedule appointment.

## 2019-11-18 PROBLEM — S86.302A PERONEAL TENDON INJURY, LEFT, INITIAL ENCOUNTER: Status: ACTIVE | Noted: 2019-11-18

## 2019-12-10 ENCOUNTER — TELEPHONE (OUTPATIENT)
Dept: OBSTETRICS AND GYNECOLOGY | Facility: CLINIC | Age: 54
End: 2019-12-10

## 2019-12-10 NOTE — TELEPHONE ENCOUNTER
----- Message from Yovana Gloria MA sent at 12/10/2019  9:40 AM CST -----  Contact: Self      ----- Message -----  From: Soledad Hernandez  Sent: 12/10/2019   9:09 AM CST  To: Abeba Wallace    Yaneth Peres  MRN: [id number]  Home Phone      [phone]  Work Phone      Not on file.  Mobile          [phone]    Patient Care Team:  Cash Dempsey MD as PCP - General (Family Medicine)  Carrington Us MD as Obstetrician (Obstetrics)  OB? No  What phone number can you be reached at? [phone]  Message:   Pt would like to see if she can be seen tomorrow by Dr. Us for a rash on her breast. Please advise.

## 2019-12-10 NOTE — TELEPHONE ENCOUNTER
Pt calling with c/o rash on breast and requesting appointment with Dr. Us tomorrow. Pt recently seen by Derm, dx with Scabies. Instructed pt she should see derm again, pt refuses. Demands appt with Dr. Us for evaluation. Informed pt we will probably refer her back out to derm for eval of recurring rash. Pt voiced understanding.

## 2019-12-11 ENCOUNTER — OFFICE VISIT (OUTPATIENT)
Dept: OBSTETRICS AND GYNECOLOGY | Facility: CLINIC | Age: 54
End: 2019-12-11
Payer: MEDICAID

## 2019-12-11 VITALS
HEIGHT: 62 IN | RESPIRATION RATE: 18 BRPM | HEART RATE: 80 BPM | DIASTOLIC BLOOD PRESSURE: 76 MMHG | SYSTOLIC BLOOD PRESSURE: 110 MMHG | WEIGHT: 227 LBS | BODY MASS INDEX: 41.77 KG/M2

## 2019-12-11 DIAGNOSIS — L30.9 DERMATITIS: Primary | ICD-10-CM

## 2019-12-11 PROCEDURE — 99999 PR PBB SHADOW E&M-EST. PATIENT-LVL III: ICD-10-PCS | Mod: PBBFAC,,, | Performed by: OBSTETRICS & GYNECOLOGY

## 2019-12-11 PROCEDURE — 99213 OFFICE O/P EST LOW 20 MIN: CPT | Mod: S$PBB,,, | Performed by: OBSTETRICS & GYNECOLOGY

## 2019-12-11 PROCEDURE — 99213 PR OFFICE/OUTPT VISIT, EST, LEVL III, 20-29 MIN: ICD-10-PCS | Mod: S$PBB,,, | Performed by: OBSTETRICS & GYNECOLOGY

## 2019-12-11 PROCEDURE — 99213 OFFICE O/P EST LOW 20 MIN: CPT | Mod: PBBFAC | Performed by: OBSTETRICS & GYNECOLOGY

## 2019-12-11 PROCEDURE — 99999 PR PBB SHADOW E&M-EST. PATIENT-LVL III: CPT | Mod: PBBFAC,,, | Performed by: OBSTETRICS & GYNECOLOGY

## 2019-12-11 RX ORDER — HYDROCODONE BITARTRATE AND ACETAMINOPHEN 5; 325 MG/1; MG/1
1 TABLET ORAL EVERY 6 HOURS PRN
Qty: 20 TABLET | Refills: 0 | Status: SHIPPED | OUTPATIENT
Start: 2019-12-11 | End: 2019-12-30 | Stop reason: SDUPTHER

## 2019-12-11 RX ORDER — CLOTRIMAZOLE AND BETAMETHASONE DIPROPIONATE 10; .64 MG/G; MG/G
CREAM TOPICAL 2 TIMES DAILY
Qty: 15 G | Refills: 1 | Status: SHIPPED | OUTPATIENT
Start: 2019-12-11 | End: 2020-01-10

## 2019-12-12 ENCOUNTER — TELEPHONE (OUTPATIENT)
Dept: OBSTETRICS AND GYNECOLOGY | Facility: CLINIC | Age: 54
End: 2019-12-12

## 2019-12-12 NOTE — PROGRESS NOTES
Subjective:    Patient ID: Yaneth Peres is a 54 y.o. y.o. female    Chief Complaint:   Chief Complaint   Patient presents with    Breast Problem     Sore on breast from scratching        History of Present Illness:  Yaneth presents today for evaluation of a rash over her right breast that she noted a few weeks ago. She complains of itching and states that the area has been getting bigger. She denies breast pain or masses, nipple discharge. She also complains of pain in her left foot and states that she is due to have surgery in the near future for repair of a torn tendon.      Review of Systems   Constitutional: Negative for activity change, appetite change, chills, diaphoresis, fatigue, fever and unexpected weight change.   HENT: Negative for mouth sores and tinnitus.    Eyes: Negative for discharge and visual disturbance.   Respiratory: Negative for cough, shortness of breath and wheezing.    Cardiovascular: Negative for chest pain, palpitations and leg swelling.   Gastrointestinal: Negative for abdominal pain, blood in stool, constipation, diarrhea, nausea and vomiting.   Endocrine: Negative for diabetes, hair loss, hot flashes, hyperthyroidism and hypothyroidism.   Genitourinary: Negative for decreased libido, dyspareunia, dysuria, flank pain, frequency, genital sores, hematuria, menorrhagia, menstrual problem, pelvic pain, urgency, vaginal bleeding, vaginal discharge, vaginal pain, urinary incontinence, postcoital bleeding and vaginal odor.   Musculoskeletal: Negative for back pain, joint swelling and myalgias.        Left foot pain   Integumentary:  Positive for breast skin changes (rash right breast). Negative for rash, acne, hair changes and nipple discharge.   Neurological: Negative for seizures, syncope, numbness and headaches.   Hematological: Negative for adenopathy. Does not bruise/bleed easily.   Psychiatric/Behavioral: Negative for sleep disturbance. The patient is not nervous/anxious.    Breast:  Positive for skin changes (rash right breast).Negative for mastodynia and nipple discharge          Objective:    Vital Signs:  Vitals:    12/11/19 1706   BP: 110/76   Pulse: 80   Resp: 18       Physical Exam:  General:  alert,normal appearing gravid female   Skin:  Skin color, texture, turgor normal. No rashes or lesions   Breasts:  Nipples are protruding and have no nipple discharge. No palpable masses, erythema, tenderness, or adenopathy. There is an area about 2cm at 10 oclock of the right breast lateral to the areola with erythema and scaling suggestive of fungal dermatitis               Assessment:      1. Dermatitis          Plan:      Dermatitis  -     clotrimazole-betamethasone 1-0.05% (LOTRISONE) cream; Apply topically 2 (two) times daily.  Dispense: 15 g; Refill: 1    Other orders  -     HYDROcodone-acetaminophen (NORCO) 5-325 mg per tablet; Take 1 tablet by mouth every 6 (six) hours as needed for pain.  Dispense: 20 tablet; Refill: 0

## 2019-12-12 NOTE — TELEPHONE ENCOUNTER
----- Message from Saskia Marx MA sent at 12/12/2019  9:18 AM CST -----  Contact: Libia Montemayor      ----- Message -----  From: Wendy Carlin MA  Sent: 12/12/2019   9:04 AM CST  To: Abeba Wallace    Yaneth Peres  MRN: [id number]  Home Phone      [phone]  Work Phone      Not on file.  [employer]          [phone]    Patient Care Team:  Cash Dempsey MD as PCP - General (Family Medicine)  Carrington Us MD as Obstetrician (Obstetrics)  OB? No  What phone number can you be reached at?   [phone]  Message:   Has questions regarding Norco Rx that was called in.  Stated there is a drug interaction.  Also needs to know for LOTRISONE medication where she needs to apply the cream at.

## 2019-12-30 ENCOUNTER — OFFICE VISIT (OUTPATIENT)
Dept: OBSTETRICS AND GYNECOLOGY | Facility: CLINIC | Age: 54
End: 2019-12-30
Payer: MEDICAID

## 2019-12-30 VITALS
RESPIRATION RATE: 15 BRPM | DIASTOLIC BLOOD PRESSURE: 86 MMHG | WEIGHT: 222.63 LBS | SYSTOLIC BLOOD PRESSURE: 130 MMHG | HEART RATE: 64 BPM | HEIGHT: 62 IN | BODY MASS INDEX: 40.97 KG/M2

## 2019-12-30 DIAGNOSIS — L30.9 DERMATITIS: Primary | ICD-10-CM

## 2019-12-30 PROCEDURE — 99999 PR PBB SHADOW E&M-EST. PATIENT-LVL III: CPT | Mod: PBBFAC,,, | Performed by: OBSTETRICS & GYNECOLOGY

## 2019-12-30 PROCEDURE — 99213 OFFICE O/P EST LOW 20 MIN: CPT | Mod: S$PBB,,, | Performed by: OBSTETRICS & GYNECOLOGY

## 2019-12-30 PROCEDURE — 99213 PR OFFICE/OUTPT VISIT, EST, LEVL III, 20-29 MIN: ICD-10-PCS | Mod: S$PBB,,, | Performed by: OBSTETRICS & GYNECOLOGY

## 2019-12-30 PROCEDURE — 99999 PR PBB SHADOW E&M-EST. PATIENT-LVL III: ICD-10-PCS | Mod: PBBFAC,,, | Performed by: OBSTETRICS & GYNECOLOGY

## 2019-12-30 PROCEDURE — 99213 OFFICE O/P EST LOW 20 MIN: CPT | Mod: PBBFAC | Performed by: OBSTETRICS & GYNECOLOGY

## 2019-12-30 RX ORDER — HYDROCODONE BITARTRATE AND ACETAMINOPHEN 5; 325 MG/1; MG/1
1 TABLET ORAL EVERY 6 HOURS PRN
Qty: 20 TABLET | Refills: 0 | Status: SHIPPED | OUTPATIENT
Start: 2019-12-30 | End: 2021-10-04

## 2019-12-30 RX ORDER — HYDROCODONE BITARTRATE AND ACETAMINOPHEN 5; 325 MG/1; MG/1
1 TABLET ORAL EVERY 6 HOURS PRN
Qty: 20 TABLET | Refills: 0 | Status: SHIPPED | OUTPATIENT
Start: 2019-12-30 | End: 2019-12-30

## 2019-12-30 NOTE — PROGRESS NOTES
Subjective:    Patient ID: Yaneth Peres is a 54 y.o. y.o. female    Chief Complaint:   Chief Complaint   Patient presents with    Follow-up       History of Present Illness:  Yaneth presents today for follow up of a rash on her right breast. She has been using lotrisone cream and states it is getting better. She continues with left foot pain and is scheduled for surgery on Friday. She desires a refill of her pain medication.      Review of Systems   Constitutional: Negative for activity change, appetite change, chills, diaphoresis, fatigue, fever and unexpected weight change.   HENT: Negative for mouth sores and tinnitus.    Eyes: Negative for discharge and visual disturbance.   Respiratory: Negative for cough, shortness of breath and wheezing.    Cardiovascular: Negative for chest pain, palpitations and leg swelling.   Gastrointestinal: Negative for abdominal pain, blood in stool, constipation, diarrhea, nausea and vomiting.   Endocrine: Negative for diabetes, hair loss, hot flashes, hyperthyroidism and hypothyroidism.   Genitourinary: Negative for decreased libido, dyspareunia, dysuria, flank pain, frequency, genital sores, hematuria, menorrhagia, menstrual problem, pelvic pain, urgency, vaginal bleeding, vaginal discharge, vaginal pain, urinary incontinence, postcoital bleeding and vaginal odor.   Musculoskeletal: Positive for arthralgias (left foot). Negative for back pain, joint swelling and myalgias.   Integumentary:  Positive for rash. Negative for acne, hair changes and nipple discharge.   Neurological: Negative for seizures, syncope, numbness and headaches.   Hematological: Negative for adenopathy. Does not bruise/bleed easily.   Psychiatric/Behavioral: Negative for sleep disturbance. The patient is not nervous/anxious.    Breast: Negative for mastodynia and nipple discharge          Objective:    Vital Signs:  Vitals:    12/30/19 1316   BP: 130/86   Pulse: 64   Resp: 15       Physical Exam:  General:   alert,normal appearing gravid female   Skin:  Skin color, texture, turgor normal. No rashes or lesions   Breasts: Rash is improving. NO masses, nipple discharge, adenopathy.         Assessment:      1. Dermatitis          Plan:      Dermatitis    Other orders  -     HYDROcodone-acetaminophen (NORCO) 5-325 mg per tablet; Take 1 tablet by mouth every 6 (six) hours as needed for pain.  Dispense: 20 tablet; Refill: 0            '

## 2020-01-14 ENCOUNTER — HOSPITAL ENCOUNTER (EMERGENCY)
Facility: HOSPITAL | Age: 55
Discharge: HOME | End: 2020-01-14
Attending: EMERGENCY MEDICINE
Payer: MEDICAID

## 2020-01-14 VITALS
WEIGHT: 222 LBS | TEMPERATURE: 98 F | SYSTOLIC BLOOD PRESSURE: 161 MMHG | DIASTOLIC BLOOD PRESSURE: 88 MMHG | HEART RATE: 101 BPM | OXYGEN SATURATION: 97 % | RESPIRATION RATE: 20 BRPM | BODY MASS INDEX: 40.6 KG/M2

## 2020-01-14 DIAGNOSIS — G89.18 POSTOPERATIVE PAIN: ICD-10-CM

## 2020-01-14 DIAGNOSIS — R52 PAIN: ICD-10-CM

## 2020-01-14 DIAGNOSIS — M96.89 POSTOPERATIVE SURGICAL COMPLICATION INVOLVING MUSCULOSKELETAL SYSTEM ASSOCIATED WITH MUSCULOSKELETAL PROCEDURE, UNSPECIFIED COMPLICATION: Primary | ICD-10-CM

## 2020-01-14 LAB
ALBUMIN SERPL BCP-MCNC: 3.6 G/DL (ref 3.5–5.2)
ALP SERPL-CCNC: 128 U/L (ref 55–135)
ALT SERPL W/O P-5'-P-CCNC: 99 U/L (ref 10–44)
ANION GAP SERPL CALC-SCNC: 8 MMOL/L (ref 8–16)
AST SERPL-CCNC: 25 U/L (ref 10–40)
BASOPHILS # BLD AUTO: 0.03 K/UL (ref 0–0.2)
BASOPHILS NFR BLD: 0.3 % (ref 0–1.9)
BILIRUB SERPL-MCNC: 0.2 MG/DL (ref 0.1–1)
BUN SERPL-MCNC: 14 MG/DL (ref 6–20)
CALCIUM SERPL-MCNC: 9.6 MG/DL (ref 8.7–10.5)
CHLORIDE SERPL-SCNC: 104 MMOL/L (ref 95–110)
CO2 SERPL-SCNC: 30 MMOL/L (ref 23–29)
CREAT SERPL-MCNC: 0.8 MG/DL (ref 0.5–1.4)
DIFFERENTIAL METHOD: ABNORMAL
EOSINOPHIL # BLD AUTO: 0.3 K/UL (ref 0–0.5)
EOSINOPHIL NFR BLD: 3.3 % (ref 0–8)
ERYTHROCYTE [DISTWIDTH] IN BLOOD BY AUTOMATED COUNT: 12.8 % (ref 11.5–14.5)
EST. GFR  (AFRICAN AMERICAN): >60 ML/MIN/1.73 M^2
EST. GFR  (NON AFRICAN AMERICAN): >60 ML/MIN/1.73 M^2
GLUCOSE SERPL-MCNC: 112 MG/DL (ref 70–110)
HCT VFR BLD AUTO: 39.8 % (ref 37–48.5)
HGB BLD-MCNC: 12.7 G/DL (ref 12–16)
IMM GRANULOCYTES # BLD AUTO: 0.04 K/UL (ref 0–0.04)
IMM GRANULOCYTES NFR BLD AUTO: 0.4 % (ref 0–0.5)
LACTATE SERPL-SCNC: 1 MMOL/L (ref 0.5–2.2)
LYMPHOCYTES # BLD AUTO: 3.6 K/UL (ref 1–4.8)
LYMPHOCYTES NFR BLD: 35.1 % (ref 18–48)
MCH RBC QN AUTO: 28.6 PG (ref 27–31)
MCHC RBC AUTO-ENTMCNC: 31.9 G/DL (ref 32–36)
MCV RBC AUTO: 90 FL (ref 82–98)
MONOCYTES # BLD AUTO: 0.7 K/UL (ref 0.3–1)
MONOCYTES NFR BLD: 7.1 % (ref 4–15)
NEUTROPHILS # BLD AUTO: 5.5 K/UL (ref 1.8–7.7)
NEUTROPHILS NFR BLD: 53.8 % (ref 38–73)
NRBC BLD-RTO: 0 /100 WBC
PLATELET # BLD AUTO: 363 K/UL (ref 150–350)
PMV BLD AUTO: 10.6 FL (ref 9.2–12.9)
POTASSIUM SERPL-SCNC: 3.5 MMOL/L (ref 3.5–5.1)
PROT SERPL-MCNC: 7 G/DL (ref 6–8.4)
RBC # BLD AUTO: 4.44 M/UL (ref 4–5.4)
SODIUM SERPL-SCNC: 142 MMOL/L (ref 136–145)
WBC # BLD AUTO: 10.24 K/UL (ref 3.9–12.7)

## 2020-01-14 PROCEDURE — 99284 EMERGENCY DEPT VISIT MOD MDM: CPT | Mod: 25

## 2020-01-14 PROCEDURE — 87040 BLOOD CULTURE FOR BACTERIA: CPT

## 2020-01-14 PROCEDURE — 80053 COMPREHEN METABOLIC PANEL: CPT

## 2020-01-14 PROCEDURE — 96376 TX/PRO/DX INJ SAME DRUG ADON: CPT

## 2020-01-14 PROCEDURE — 63600175 PHARM REV CODE 636 W HCPCS: Performed by: EMERGENCY MEDICINE

## 2020-01-14 PROCEDURE — 83605 ASSAY OF LACTIC ACID: CPT

## 2020-01-14 PROCEDURE — 96365 THER/PROPH/DIAG IV INF INIT: CPT

## 2020-01-14 PROCEDURE — 85025 COMPLETE CBC W/AUTO DIFF WBC: CPT

## 2020-01-14 PROCEDURE — 96375 TX/PRO/DX INJ NEW DRUG ADDON: CPT

## 2020-01-14 PROCEDURE — 36415 COLL VENOUS BLD VENIPUNCTURE: CPT

## 2020-01-14 RX ORDER — ONDANSETRON 2 MG/ML
4 INJECTION INTRAMUSCULAR; INTRAVENOUS
Status: COMPLETED | OUTPATIENT
Start: 2020-01-14 | End: 2020-01-14

## 2020-01-14 RX ORDER — OXYCODONE AND ACETAMINOPHEN 5; 325 MG/1; MG/1
1 TABLET ORAL EVERY 4 HOURS PRN
Qty: 18 TABLET | Refills: 0 | Status: SHIPPED | OUTPATIENT
Start: 2020-01-14 | End: 2020-02-17

## 2020-01-14 RX ORDER — SULFAMETHOXAZOLE AND TRIMETHOPRIM 800; 160 MG/1; MG/1
1 TABLET ORAL 2 TIMES DAILY
Qty: 14 TABLET | Refills: 0 | Status: SHIPPED | OUTPATIENT
Start: 2020-01-14 | End: 2020-01-21

## 2020-01-14 RX ORDER — MORPHINE SULFATE 2 MG/ML
2 INJECTION, SOLUTION INTRAMUSCULAR; INTRAVENOUS
Status: COMPLETED | OUTPATIENT
Start: 2020-01-14 | End: 2020-01-14

## 2020-01-14 RX ORDER — VANCOMYCIN HCL IN 5 % DEXTROSE 1G/250ML
1000 PLASTIC BAG, INJECTION (ML) INTRAVENOUS
Status: COMPLETED | OUTPATIENT
Start: 2020-01-14 | End: 2020-01-14

## 2020-01-14 RX ADMIN — VANCOMYCIN HYDROCHLORIDE 1000 MG: 1 INJECTION, POWDER, LYOPHILIZED, FOR SOLUTION INTRAVENOUS at 08:01

## 2020-01-14 RX ADMIN — MORPHINE SULFATE 2 MG: 2 INJECTION, SOLUTION INTRAMUSCULAR; INTRAVENOUS at 09:01

## 2020-01-14 RX ADMIN — MORPHINE SULFATE 2 MG: 2 INJECTION, SOLUTION INTRAMUSCULAR; INTRAVENOUS at 08:01

## 2020-01-14 RX ADMIN — ONDANSETRON 4 MG: 2 INJECTION INTRAMUSCULAR; INTRAVENOUS at 08:01

## 2020-01-15 NOTE — ED NOTES
Splint and wrap to left lower leg removed   Slight swelling noted to ankle area     Incisions without signs and symptoms of infection

## 2020-01-15 NOTE — ED TRIAGE NOTES
54 y.o. female presents to ER qTrack 01/qTrk 01   Chief Complaint   Patient presents with    Ankle Pain     pt states she had left ankle surgery on 1/3/20 and she is having severe pain    Fever     101.0 at 4pm pt states she took tylenol for her fever   . No acute distress noted.

## 2020-01-15 NOTE — ED NOTES
Left lower leg rewrapped using original posterior splint   Cast padding used against patient skin and posterior splint applied and wrapped with ace wraps

## 2020-01-15 NOTE — ED PROVIDER NOTES
Encounter Date: 1/14/2020       History     Chief Complaint   Patient presents with    Ankle Pain     pt states she had left ankle surgery on 1/3/20 and she is having severe pain    Fever     101.0 at 4pm pt states she took tylenol for her fever     Patient on 1/3/20 had ankle surgery at [facility].   States no follow up as of today with Orthopedic    2125:    Discuss case with Dr. Parminder whitfield orthopedic at [facility].  States patient can follow up at outpatient clinic.   Recommended to start bactrim DS for antibiotic coverage    The history is provided by the patient.   Fever   Primary symptoms of the febrile illness include fever. Primary symptoms do not include visual change, headaches, cough, wheezing, shortness of breath, abdominal pain, nausea, vomiting, diarrhea, dysuria or rash. The current episode started 3 to 5 days ago. This is a new problem. The problem has been resolved.   The maximum temperature recorded prior to her arrival was 101 to 101.9 F. The temperature was taken by an oral thermometer.     Review of patient's allergies indicates:   Allergen Reactions    Penicillins Rash     Past Medical History:   Diagnosis Date    Anxiety     Hypertension     Peroneal tendon injury     PONV (postoperative nausea and vomiting)      Past Surgical History:   Procedure Laterality Date    CHOLECYSTECTOMY  2018    EXCISION OF LESION OF TENDON Left 1/3/2020    Procedure: EXCISION, LESION, TENDON;  Surgeon: Andrew Archibald MD;  Location: [facility];  Service: Orthopedics;  Laterality: Left;  left peroneals - brevis to longus tenodesis with side to side anastamosis    FOOT GANGLION EXCISION Left 1/3/2020    Procedure: EXCISION, GANGLION CYST, FOOT;  Surgeon: Andrew Archibald MD;  Location: [facility];  Service: Orthopedics;  Laterality: Left;    HYSTERECTOMY      LAPAROSCOPIC SALPINGO-OOPHORECTOMY Bilateral 2/18/2019    Procedure: SALPINGO-OOPHORECTOMY, LAPAROSCOPIC;  Surgeon: Carrington Us MD;  Location: [facility]  OR;  Service: OB/GYN;  Laterality: Bilateral;    plate put in right arm      now removed     Family History   Problem Relation Age of Onset    Hypertension Father     Diabetes Father     Hypertension Mother     Diabetes Mother     Breast cancer Neg Hx     Colon cancer Neg Hx     Cancer Neg Hx     Ovarian cancer Neg Hx      Social History     Tobacco Use    Smoking status: Never Smoker    Smokeless tobacco: Never Used   Substance Use Topics    Alcohol use: No    Drug use: No     Review of Systems   Constitutional: Positive for fever.   HENT: Negative for ear discharge and ear pain.    Eyes: Negative for pain and redness.   Respiratory: Negative for cough, shortness of breath and wheezing.    Cardiovascular: Negative for chest pain.   Gastrointestinal: Negative for abdominal pain, diarrhea, nausea and vomiting.   Genitourinary: Negative for dysuria and enuresis.   Musculoskeletal: Negative for back pain, neck pain and neck stiffness.   Skin: Negative for rash.   Neurological: Negative for headaches.       Physical Exam     Initial Vitals [01/14/20 1810]   BP Pulse Resp Temp SpO2   (!) 161/88 101 20 97.6 °F (36.4 °C) 97 %      MAP       --         Physical Exam    Nursing note and vitals reviewed.  Constitutional: She appears well-developed and well-nourished. She is not diaphoretic. No distress.   HENT:   Head: Normocephalic and atraumatic.   Mouth/Throat: No oropharyngeal exudate.   Eyes: EOM are normal. Pupils are equal, round, and reactive to light.   Neck: Normal range of motion. Neck supple. No JVD present.   Pulmonary/Chest: Breath sounds normal. No stridor. No respiratory distress. She has no wheezes. She has no rhonchi. She has no rales.   Abdominal: Soft. Bowel sounds are normal. She exhibits no distension. There is no tenderness. There is no rebound and no guarding.   Musculoskeletal: Normal range of motion. She exhibits edema and tenderness.        Feet:    Neurological: She is alert and  oriented to person, place, and time. No sensory deficit.   Skin: No rash noted. There is erythema.         ED Course   Procedures  Labs Reviewed   CBC W/ AUTO DIFFERENTIAL - Abnormal; Notable for the following components:       Result Value    Mean Corpuscular Hemoglobin Conc 31.9 (*)     Platelets 363 (*)     All other components within normal limits   CULTURE, BLOOD   CULTURE, BLOOD   COMPREHENSIVE METABOLIC PANEL   LACTIC ACID, PLASMA          Imaging Results    None                                          Clinical Impression:       ICD-10-CM ICD-9-CM   1. Postoperative surgical complication involving musculoskeletal system associated with musculoskeletal procedure, unspecified complication M96.89 997.99   2. Pain R52 780.96   3. Postoperative pain G89.18 338.18         Disposition:   Disposition: Discharged  Condition: Stable                     Jamir Paris MD  01/14/20 2130       Jamir Paris MD  01/15/20 0322

## 2020-01-19 LAB
BACTERIA BLD CULT: NORMAL
BACTERIA BLD CULT: NORMAL

## 2020-01-29 ENCOUNTER — NURSE TRIAGE (OUTPATIENT)
Dept: ADMINISTRATIVE | Facility: CLINIC | Age: 55
End: 2020-01-29

## 2020-01-30 NOTE — TELEPHONE ENCOUNTER
Pt calling, states she had stitches removed from incision today and reports they did not clean the incision before putting the SLS on. I informed her that the doctor had noted the incision looked well-healed with no signs of infection. Pt is extremely anxious about this issue, but she is currently asymptomatic. I informed her that I would send a message to Dr. Horner's office for someone to call her back and confirm incisional/SLS care from now until next appointment. In the meantime, I advised she call us back if she developed any other symptoms that could possibly indicate infected incision. Pt verbalized understanding and thanks.    Reason for Disposition   General information question, no triage required and triager able to answer question    Protocols used: INFORMATION ONLY CALL-A-AH

## 2020-03-23 ENCOUNTER — TELEPHONE (OUTPATIENT)
Dept: OBSTETRICS AND GYNECOLOGY | Facility: CLINIC | Age: 55
End: 2020-03-23

## 2020-03-23 NOTE — TELEPHONE ENCOUNTER
Have her take 2 estradiol tabs daily. Symptoms should respond in about 3-4 days. If not improving, it may not be a hormone problem. If she needs refill, let me know.

## 2020-03-23 NOTE — TELEPHONE ENCOUNTER
Patient having sweating and not sleeping with the estradiol 1mg and it seems to be worse since her surgery. States she even tried celexa and its not helping Please advise

## 2020-03-23 NOTE — TELEPHONE ENCOUNTER
----- Message from Wendy Carlin MA sent at 3/23/2020 11:39 AM CDT -----  Contact: juancarlos Peres  MRN: [id number]  Home Phone      [phone]  Work Phone      Not on file.  Mobile          [phone]    Patient Care Team:  Cash Dempsey MD as PCP - General (Family Medicine)  Carrington Us MD as Obstetrician (Obstetrics)  OB? No  What phone number can you be reached at?  [phone]  Message:  Needs to speak to nurse regarding getting something called in for hormones.    Pharmacy:  [facility]

## 2020-07-20 ENCOUNTER — OFFICE VISIT (OUTPATIENT)
Dept: OBSTETRICS AND GYNECOLOGY | Facility: CLINIC | Age: 55
End: 2020-07-20
Payer: MEDICAID

## 2020-07-20 VITALS
SYSTOLIC BLOOD PRESSURE: 118 MMHG | BODY MASS INDEX: 43.98 KG/M2 | RESPIRATION RATE: 14 BRPM | HEART RATE: 84 BPM | WEIGHT: 239 LBS | DIASTOLIC BLOOD PRESSURE: 72 MMHG | HEIGHT: 62 IN

## 2020-07-20 DIAGNOSIS — N30.90 CYSTITIS: Primary | ICD-10-CM

## 2020-07-20 PROCEDURE — 99213 PR OFFICE/OUTPT VISIT, EST, LEVL III, 20-29 MIN: ICD-10-PCS | Mod: S$PBB,,, | Performed by: OBSTETRICS & GYNECOLOGY

## 2020-07-20 PROCEDURE — 99999 PR PBB SHADOW E&M-EST. PATIENT-LVL IV: CPT | Mod: PBBFAC,,, | Performed by: OBSTETRICS & GYNECOLOGY

## 2020-07-20 PROCEDURE — 99214 OFFICE O/P EST MOD 30 MIN: CPT | Mod: PBBFAC | Performed by: OBSTETRICS & GYNECOLOGY

## 2020-07-20 PROCEDURE — 99213 OFFICE O/P EST LOW 20 MIN: CPT | Mod: S$PBB,,, | Performed by: OBSTETRICS & GYNECOLOGY

## 2020-07-20 PROCEDURE — 99999 PR PBB SHADOW E&M-EST. PATIENT-LVL IV: ICD-10-PCS | Mod: PBBFAC,,, | Performed by: OBSTETRICS & GYNECOLOGY

## 2020-07-20 RX ORDER — SULFAMETHOXAZOLE AND TRIMETHOPRIM 800; 160 MG/1; MG/1
1 TABLET ORAL 2 TIMES DAILY
Qty: 20 TABLET | Refills: 0 | Status: SHIPPED | OUTPATIENT
Start: 2020-07-20 | End: 2020-07-30

## 2020-07-20 NOTE — PROGRESS NOTES
Subjective:    Patient ID: Yaneth Peres is a 55 y.o. y.o. female.     Chief Complaint:   Chief Complaint   Patient presents with    Vaginitis     possible yeast       History of Present Illness:  Yaneth presents today complaining of urinary frequency, dysuria and cloudy urine. Symptoms have been present for 2  weeks and have been gradually worsening. She also denies : chills and fever      Review of Systems   Constitutional: Negative for activity change, appetite change, chills, diaphoresis, fatigue, fever and unexpected weight change.   HENT: Negative for mouth sores and tinnitus.    Eyes: Negative for discharge and visual disturbance.   Respiratory: Negative for cough, shortness of breath and wheezing.    Cardiovascular: Negative for chest pain, palpitations and leg swelling.   Gastrointestinal: Negative for abdominal pain, blood in stool, constipation, diarrhea, nausea and vomiting.   Endocrine: Negative for diabetes, hair loss, hot flashes, hyperthyroidism and hypothyroidism.   Genitourinary: Positive for dysuria and frequency. Negative for decreased libido, dyspareunia, flank pain, genital sores, hematuria, menorrhagia, menstrual problem, pelvic pain, urgency, vaginal bleeding, vaginal discharge, vaginal pain, urinary incontinence, postcoital bleeding and vaginal odor.   Musculoskeletal: Positive for arthralgias and leg pain. Negative for back pain, joint swelling and myalgias.   Integumentary:  Negative for rash, acne, hair changes and nipple discharge.   Neurological: Negative for seizures, syncope, numbness and headaches.   Hematological: Negative for adenopathy. Does not bruise/bleed easily.   Psychiatric/Behavioral: Negative for sleep disturbance. The patient is not nervous/anxious.    Breast: Negative for mastodynia and nipple discharge          Objective:    Vital Signs:  Vitals:    07/20/20 0857   BP: 118/72   Pulse: 84   Resp: 14       Physical Exam:  General:  alert,normal appearing gravid  female   Skin:  Skin color, texture, turgor normal. No rashes or lesions   Neck: supple, trachea midline, no adenopathy or thyromegaly   Respiratory:  clear to auscultation bilaterally   Heart:  regular rate and rhythm, S1, S2 normal, no murmur, click, rub or gallop   Abdomen:  Soft, non-tender. Bowel sounds normal. No masses,  no organomegaly   Pelvis: External genitalia: normal general appearance  Urinary system: urethral meatus normal, bladder tender to palpation  Vaginal: normal mucosa without prolapse or lesions  Cervix: removed surgically  Uterus: removed surgically  Adnexa: non palpable               Assessment:      1. Cystitis          Plan:      Cystitis    Other orders  -     sulfamethoxazole-trimethoprim 800-160mg (BACTRIM DS) 800-160 mg Tab; Take 1 tablet by mouth 2 (two) times daily. for 10 days  Dispense: 20 tablet; Refill: 0            Carrington H. MD [facility] FACOG    07/20/2020  9:25 AM

## 2020-08-07 ENCOUNTER — TELEPHONE (OUTPATIENT)
Dept: OBSTETRICS AND GYNECOLOGY | Facility: CLINIC | Age: 55
End: 2020-08-07

## 2020-08-07 NOTE — TELEPHONE ENCOUNTER
Patient states did not finish antibiotics for her bladder. Feels like symptoms did not resolve. Offered appointment today for UA/Culture. Pt declines, states will wait until appointment Tuesday with Dr. Us because she also wants pelvic exam.

## 2020-08-12 ENCOUNTER — TELEPHONE (OUTPATIENT)
Dept: OBSTETRICS AND GYNECOLOGY | Facility: CLINIC | Age: 55
End: 2020-08-12

## 2020-08-12 ENCOUNTER — OFFICE VISIT (OUTPATIENT)
Dept: OBSTETRICS AND GYNECOLOGY | Facility: CLINIC | Age: 55
End: 2020-08-12
Payer: MEDICAID

## 2020-08-12 VITALS
DIASTOLIC BLOOD PRESSURE: 76 MMHG | HEART RATE: 84 BPM | RESPIRATION RATE: 14 BRPM | BODY MASS INDEX: 42.33 KG/M2 | SYSTOLIC BLOOD PRESSURE: 124 MMHG | HEIGHT: 62 IN | WEIGHT: 230 LBS

## 2020-08-12 DIAGNOSIS — L30.9 DERMATITIS: ICD-10-CM

## 2020-08-12 DIAGNOSIS — N76.1 SUBACUTE VAGINITIS: Primary | ICD-10-CM

## 2020-08-12 PROCEDURE — 87661 TRICHOMONAS VAGINALIS AMPLIF: CPT | Mod: 59

## 2020-08-12 PROCEDURE — 99213 OFFICE O/P EST LOW 20 MIN: CPT | Mod: PBBFAC | Performed by: OBSTETRICS & GYNECOLOGY

## 2020-08-12 PROCEDURE — 99213 PR OFFICE/OUTPT VISIT, EST, LEVL III, 20-29 MIN: ICD-10-PCS | Mod: S$PBB,,, | Performed by: OBSTETRICS & GYNECOLOGY

## 2020-08-12 PROCEDURE — 99999 PR PBB SHADOW E&M-EST. PATIENT-LVL III: ICD-10-PCS | Mod: PBBFAC,,, | Performed by: OBSTETRICS & GYNECOLOGY

## 2020-08-12 PROCEDURE — 87481 CANDIDA DNA AMP PROBE: CPT | Mod: 59

## 2020-08-12 PROCEDURE — 87801 DETECT AGNT MULT DNA AMPLI: CPT

## 2020-08-12 PROCEDURE — 99213 OFFICE O/P EST LOW 20 MIN: CPT | Mod: S$PBB,,, | Performed by: OBSTETRICS & GYNECOLOGY

## 2020-08-12 PROCEDURE — 99999 PR PBB SHADOW E&M-EST. PATIENT-LVL III: CPT | Mod: PBBFAC,,, | Performed by: OBSTETRICS & GYNECOLOGY

## 2020-08-12 RX ORDER — CLOBETASOL PROPIONATE 0.5 MG/G
CREAM TOPICAL 2 TIMES DAILY
Qty: 15 G | Refills: 0 | Status: SHIPPED | OUTPATIENT
Start: 2020-08-12 | End: 2021-10-04 | Stop reason: SDUPTHER

## 2020-08-12 NOTE — TELEPHONE ENCOUNTER
----- Message from Denita Carlin sent at 8/12/2020  1:29 PM CDT -----  Contact: self  Yaneth Peres  MRN: [id number]  Home Phone      [phone]  Work Phone      Not on file.  Mobile          [phone]    Patient Care Team:  Cash Dempsey MD as PCP - General (Family Medicine)  Carrington Us MD as Obstetrician (Obstetrics)  OB? No  What phone number can you be reached at? [phone]  Message: Pt states she was supposed to get a cream called out for the rash that she has; computers were down when she left.   Pharmacy: [facility]

## 2020-08-12 NOTE — PROGRESS NOTES
Subjective:    Patient ID: Yaneth Peres is a 55 y.o. y.o. female.     Chief Complaint:   Chief Complaint   Patient presents with    Well Woman       History of Present Illness   Yaneth presents today complaining of vaginal/vulvar local irritation and odor. Symptoms began 1 week ago and have been persistent.  No LMP recorded. Patient has had a hysterectomy.. She denies dysuria, frequency, fever and abdominal pain. She has not tried OTC medications.     Review of Systems   Constitutional: Negative for activity change, appetite change, chills, diaphoresis, fatigue, fever and unexpected weight change.   HENT: Negative for mouth sores and tinnitus.    Eyes: Negative for discharge and visual disturbance.   Respiratory: Negative for cough, shortness of breath and wheezing.    Cardiovascular: Negative for chest pain, palpitations and leg swelling.   Gastrointestinal: Negative for abdominal pain, blood in stool, constipation, diarrhea, nausea and vomiting.   Endocrine: Negative for diabetes, hair loss, hot flashes, hyperthyroidism and hypothyroidism.   Genitourinary: Positive for vaginal pain and vaginal odor. Negative for decreased libido, dyspareunia, dysuria, flank pain, frequency, genital sores, hematuria, menorrhagia, menstrual problem, pelvic pain, urgency, vaginal bleeding, vaginal discharge, urinary incontinence and postcoital bleeding.   Musculoskeletal: Positive for arthralgias and joint swelling. Negative for back pain and myalgias.   Integumentary:  Positive for rash. Negative for acne, hair changes and nipple discharge.   Neurological: Negative for seizures, syncope, numbness and headaches.   Hematological: Negative for adenopathy. Does not bruise/bleed easily.   Psychiatric/Behavioral: Negative for sleep disturbance. The patient is not nervous/anxious.    Breast: Negative for mastodynia and nipple discharge          Objective:    Vital Signs:  Vitals:    08/12/20 1210   BP: 124/76   Pulse: 84   Resp: 14        Physical Exam:  General:  alert,normal appearing gravid female   Skin:  Skin color, texture, turgor normal. No rashes or lesions   Neck: supple, trachea midline, no adenopathy or thyromegaly   Respiratory:  clear to auscultation bilaterally   Heart:  regular rate and rhythm, S1, S2 normal, no murmur, click, rub or gallop   Abdomen:  Soft, non-tender. Bowel sounds normal. No masses,  no organomegaly   Pelvis: External genitalia: normal general appearance  Urinary system: urethral meatus normal, bladder nontender  Vaginal: discharge, nonspecific  Cervix: removed surgically  Uterus: removed surgically  Adnexa: removed surgically               Assessment:      1. Subacute vaginitis    2. Dermatitis          Plan:      Subacute vaginitis  -     POCT Vaginosis Screen by DNA Probe (Affirm)    Dermatitis  -     clobetasoL (TEMOVATE) 0.05 % cream; Apply topically 2 (two) times daily. for 10 days  Dispense: 15 g; Refill: 0            Carrington H. MD [facility]    08/12/2020  1:39 PM

## 2020-08-14 ENCOUNTER — TELEPHONE (OUTPATIENT)
Dept: OBSTETRICS AND GYNECOLOGY | Facility: CLINIC | Age: 55
End: 2020-08-14

## 2020-08-14 NOTE — TELEPHONE ENCOUNTER
----- Message from Soledad Hernandez sent at 8/14/2020  8:43 AM CDT -----  Contact: Self  Yaneth Peres  MRN: [id number]  Home Phone      [phone]  Work Phone      Not on file.  Mobile          [phone]    Patient Care Team:  Cash Dempsey MD as PCP - General (Family Medicine)  Carrington Us MD as Obstetrician (Obstetrics)  OB? No  What phone number can you be reached at? [phone]  Message:   Pt requesting results of urine sample.

## 2020-08-17 LAB
BACTERIAL VAGINOSIS DNA: POSITIVE
CANDIDA GLABRATA DNA: NEGATIVE
CANDIDA KRUSEI DNA: NEGATIVE
CANDIDA RRNA VAG QL PROBE: NEGATIVE
T VAGINALIS RRNA GENITAL QL PROBE: POSITIVE

## 2020-08-17 RX ORDER — METRONIDAZOLE 500 MG/1
TABLET ORAL
Qty: 14 TABLET | Refills: 0 | Status: SHIPPED | OUTPATIENT
Start: 2020-08-17 | End: 2021-10-04 | Stop reason: ALTCHOICE

## 2021-05-04 ENCOUNTER — PATIENT MESSAGE (OUTPATIENT)
Dept: RESEARCH | Facility: HOSPITAL | Age: 56
End: 2021-05-04

## 2021-07-01 ENCOUNTER — PATIENT MESSAGE (OUTPATIENT)
Dept: ADMINISTRATIVE | Facility: OTHER | Age: 56
End: 2021-07-01

## 2021-07-28 ENCOUNTER — TELEPHONE (OUTPATIENT)
Dept: OBSTETRICS AND GYNECOLOGY | Facility: CLINIC | Age: 56
End: 2021-07-28

## 2021-07-28 DIAGNOSIS — Z12.31 BREAST CANCER SCREENING BY MAMMOGRAM: Primary | ICD-10-CM

## 2021-10-04 ENCOUNTER — OFFICE VISIT (OUTPATIENT)
Dept: OBSTETRICS AND GYNECOLOGY | Facility: CLINIC | Age: 56
End: 2021-10-04
Payer: MEDICAID

## 2021-10-04 ENCOUNTER — TELEPHONE (OUTPATIENT)
Dept: OBSTETRICS AND GYNECOLOGY | Facility: CLINIC | Age: 56
End: 2021-10-04

## 2021-10-04 VITALS
BODY MASS INDEX: 41.56 KG/M2 | HEIGHT: 62 IN | RESPIRATION RATE: 12 BRPM | SYSTOLIC BLOOD PRESSURE: 118 MMHG | HEART RATE: 76 BPM | DIASTOLIC BLOOD PRESSURE: 72 MMHG | OXYGEN SATURATION: 100 % | WEIGHT: 225.81 LBS

## 2021-10-04 DIAGNOSIS — N39.41 URGE INCONTINENCE: ICD-10-CM

## 2021-10-04 DIAGNOSIS — L30.9 DERMATITIS: ICD-10-CM

## 2021-10-04 DIAGNOSIS — Z01.419 ENCNTR FOR GYN EXAM (GENERAL) (ROUTINE) W/O ABN FINDINGS: Primary | ICD-10-CM

## 2021-10-04 PROCEDURE — 99396 PR PREVENTIVE VISIT,EST,40-64: ICD-10-PCS | Mod: S$PBB,,, | Performed by: STUDENT IN AN ORGANIZED HEALTH CARE EDUCATION/TRAINING PROGRAM

## 2021-10-04 PROCEDURE — 99999 PR PBB SHADOW E&M-EST. PATIENT-LVL III: CPT | Mod: PBBFAC,,, | Performed by: STUDENT IN AN ORGANIZED HEALTH CARE EDUCATION/TRAINING PROGRAM

## 2021-10-04 PROCEDURE — 99999 PR PBB SHADOW E&M-EST. PATIENT-LVL III: ICD-10-PCS | Mod: PBBFAC,,, | Performed by: STUDENT IN AN ORGANIZED HEALTH CARE EDUCATION/TRAINING PROGRAM

## 2021-10-04 PROCEDURE — 99396 PREV VISIT EST AGE 40-64: CPT | Mod: S$PBB,,, | Performed by: STUDENT IN AN ORGANIZED HEALTH CARE EDUCATION/TRAINING PROGRAM

## 2021-10-04 PROCEDURE — 99213 OFFICE O/P EST LOW 20 MIN: CPT | Mod: PBBFAC | Performed by: STUDENT IN AN ORGANIZED HEALTH CARE EDUCATION/TRAINING PROGRAM

## 2021-10-04 RX ORDER — CLOBETASOL PROPIONATE 0.5 MG/G
CREAM TOPICAL 2 TIMES DAILY
Qty: 15 G | Refills: 0 | Status: SHIPPED | OUTPATIENT
Start: 2021-10-04 | End: 2021-10-14

## 2021-10-04 RX ORDER — CLOTRIMAZOLE AND BETAMETHASONE DIPROPIONATE 10; .64 MG/G; MG/G
CREAM TOPICAL 2 TIMES DAILY
Qty: 15 G | Refills: 1 | Status: SHIPPED | OUTPATIENT
Start: 2021-10-04 | End: 2021-11-03

## 2021-10-11 ENCOUNTER — HOSPITAL ENCOUNTER (OUTPATIENT)
Dept: RADIOLOGY | Facility: HOSPITAL | Age: 56
Discharge: HOME | End: 2021-10-11
Attending: FAMILY MEDICINE
Payer: MEDICAID

## 2021-10-11 DIAGNOSIS — Z12.31 BREAST CANCER SCREENING BY MAMMOGRAM: ICD-10-CM

## 2021-10-11 PROCEDURE — 77063 BREAST TOMOSYNTHESIS BI: CPT | Mod: 26,,, | Performed by: RADIOLOGY

## 2021-10-11 PROCEDURE — 77067 MAMMO DIGITAL SCREENING BILAT WITH TOMO: ICD-10-PCS | Mod: 26,,, | Performed by: RADIOLOGY

## 2021-10-11 PROCEDURE — 77067 SCR MAMMO BI INCL CAD: CPT | Mod: TC

## 2021-10-11 PROCEDURE — 77067 SCR MAMMO BI INCL CAD: CPT | Mod: 26,,, | Performed by: RADIOLOGY

## 2021-10-11 PROCEDURE — 77063 MAMMO DIGITAL SCREENING BILAT WITH TOMO: ICD-10-PCS | Mod: 26,,, | Performed by: RADIOLOGY

## 2021-10-25 ENCOUNTER — TELEPHONE (OUTPATIENT)
Dept: OBSTETRICS AND GYNECOLOGY | Facility: CLINIC | Age: 56
End: 2021-10-25
Payer: MEDICAID

## 2022-11-14 ENCOUNTER — TELEPHONE (OUTPATIENT)
Dept: OBSTETRICS AND GYNECOLOGY | Facility: CLINIC | Age: 57
End: 2022-11-14
Payer: MEDICAID

## 2022-11-14 DIAGNOSIS — Z12.31 SCREENING MAMMOGRAM FOR BREAST CANCER: Primary | ICD-10-CM

## 2022-11-14 NOTE — TELEPHONE ENCOUNTER
----- Message from Wendy Carlin MA sent at 11/14/2022 12:30 PM CST -----  Contact: juancarlos Peres  MRN: [id number]  Home Phone      [phone]  Work Phone      Not on file.  Mobile          [phone]    Patient Care Team:  Cash Dempsey MD as PCP - General (Family Medicine)  Carrington Us MD as Obstetrician (Obstetrics)  OB? No  What phone number can you be reached at? [phone]  Message: Please link mammo orders to appt 11/22/22.

## 2022-11-22 ENCOUNTER — HOSPITAL ENCOUNTER (OUTPATIENT)
Dept: RADIOLOGY | Facility: HOSPITAL | Age: 57
Discharge: HOME | End: 2022-11-22
Attending: OBSTETRICS & GYNECOLOGY
Payer: MEDICAID

## 2022-11-22 VITALS — WEIGHT: 225 LBS | HEIGHT: 62 IN | BODY MASS INDEX: 41.41 KG/M2

## 2022-11-22 DIAGNOSIS — Z12.31 SCREENING MAMMOGRAM FOR BREAST CANCER: ICD-10-CM

## 2022-11-22 PROCEDURE — 77067 MAMMO DIGITAL SCREENING BILAT WITH TOMO: ICD-10-PCS | Mod: 26,,, | Performed by: RADIOLOGY

## 2022-11-22 PROCEDURE — 77067 SCR MAMMO BI INCL CAD: CPT | Mod: 26,,, | Performed by: RADIOLOGY

## 2022-11-22 PROCEDURE — 77063 MAMMO DIGITAL SCREENING BILAT WITH TOMO: ICD-10-PCS | Mod: 26,,, | Performed by: RADIOLOGY

## 2022-11-22 PROCEDURE — 77063 BREAST TOMOSYNTHESIS BI: CPT | Mod: TC

## 2022-11-22 PROCEDURE — 77063 BREAST TOMOSYNTHESIS BI: CPT | Mod: 26,,, | Performed by: RADIOLOGY

## 2022-11-22 PROCEDURE — 77067 SCR MAMMO BI INCL CAD: CPT | Mod: TC

## 2023-03-16 PROBLEM — N32.81 OAB (OVERACTIVE BLADDER): Status: ACTIVE | Noted: 2023-03-16

## 2023-03-16 PROBLEM — N39.46 MIXED INCONTINENCE: Status: ACTIVE | Noted: 2023-03-16

## 2023-03-16 PROBLEM — R10.2 PELVIC PAIN IN FEMALE: Status: RESOLVED | Noted: 2019-02-18 | Resolved: 2023-03-16

## 2023-03-16 PROBLEM — R31.29 MICROSCOPIC HEMATURIA: Status: ACTIVE | Noted: 2023-03-16

## 2023-04-15 ENCOUNTER — HOSPITAL ENCOUNTER (EMERGENCY)
Facility: HOSPITAL | Age: 58
Discharge: HOME | End: 2023-04-15
Attending: STUDENT IN AN ORGANIZED HEALTH CARE EDUCATION/TRAINING PROGRAM
Payer: MEDICAID

## 2023-04-15 VITALS
TEMPERATURE: 97 F | RESPIRATION RATE: 18 BRPM | SYSTOLIC BLOOD PRESSURE: 166 MMHG | DIASTOLIC BLOOD PRESSURE: 79 MMHG | WEIGHT: 226 LBS | BODY MASS INDEX: 41.33 KG/M2 | OXYGEN SATURATION: 98 % | HEART RATE: 100 BPM

## 2023-04-15 DIAGNOSIS — R22.42 LOCALIZED SWELLING OF LEFT LOWER EXTREMITY: ICD-10-CM

## 2023-04-15 DIAGNOSIS — R45.89 ANXIETY ABOUT HEALTH: Primary | ICD-10-CM

## 2023-04-15 DIAGNOSIS — M89.8X5 PAIN OF LEFT FEMUR: ICD-10-CM

## 2023-04-15 PROCEDURE — 63600175 PHARM REV CODE 636 W HCPCS: Performed by: NURSE PRACTITIONER

## 2023-04-15 PROCEDURE — 96372 THER/PROPH/DIAG INJ SC/IM: CPT | Performed by: NURSE PRACTITIONER

## 2023-04-15 PROCEDURE — 99285 EMERGENCY DEPT VISIT HI MDM: CPT | Mod: 25

## 2023-04-15 RX ORDER — ORPHENADRINE CITRATE 30 MG/ML
60 INJECTION INTRAMUSCULAR; INTRAVENOUS
Status: COMPLETED | OUTPATIENT
Start: 2023-04-15 | End: 2023-04-15

## 2023-04-15 RX ORDER — KETOROLAC TROMETHAMINE 30 MG/ML
30 INJECTION, SOLUTION INTRAMUSCULAR; INTRAVENOUS
Status: COMPLETED | OUTPATIENT
Start: 2023-04-15 | End: 2023-04-15

## 2023-04-15 RX ORDER — CYCLOBENZAPRINE HCL 10 MG
10 TABLET ORAL 3 TIMES DAILY PRN
Qty: 15 TABLET | Refills: 0 | Status: SHIPPED | OUTPATIENT
Start: 2023-04-15 | End: 2023-04-15 | Stop reason: SDUPTHER

## 2023-04-15 RX ORDER — TRAMADOL HYDROCHLORIDE 50 MG/1
50 TABLET ORAL
Status: DISCONTINUED | OUTPATIENT
Start: 2023-04-15 | End: 2023-04-15

## 2023-04-15 RX ORDER — NAPROXEN 500 MG/1
500 TABLET ORAL EVERY 12 HOURS PRN
Qty: 10 TABLET | Refills: 0 | Status: SHIPPED | OUTPATIENT
Start: 2023-04-15

## 2023-04-15 RX ORDER — NAPROXEN 500 MG/1
500 TABLET ORAL EVERY 12 HOURS PRN
Qty: 10 TABLET | Refills: 0 | Status: SHIPPED | OUTPATIENT
Start: 2023-04-15 | End: 2023-04-15 | Stop reason: SDUPTHER

## 2023-04-15 RX ORDER — CYCLOBENZAPRINE HCL 10 MG
10 TABLET ORAL 3 TIMES DAILY PRN
Qty: 15 TABLET | Refills: 0 | Status: SHIPPED | OUTPATIENT
Start: 2023-04-15 | End: 2023-04-20

## 2023-04-15 RX ADMIN — ORPHENADRINE CITRATE 60 MG: 30 INJECTION INTRAMUSCULAR; INTRAVENOUS at 06:04

## 2023-04-15 RX ADMIN — KETOROLAC TROMETHAMINE 30 MG: 30 INJECTION, SOLUTION INTRAMUSCULAR; INTRAVENOUS at 06:04

## 2023-04-15 NOTE — ED PROVIDER NOTES
Encounter Date: 4/15/2023       History     Chief Complaint   Patient presents with    Leg Pain     Yaneth Peres is a 58 y.o. female with PMH of anxiety and hypertension who presents to the ED for evaluation of left leg pain.  Five week history of left lateral thigh pain.  Denies trauma or history of trauma.  Pain is described as burning sensations that is exacerbated by touch.  Currently rates pain 10/10 in severity.  She is been unable to control pain at home with Tylenol and ibuprofen.  She denies associated redness, swelling, or warmth.  Denies numbness or tingling to left lower extremity.    The history is provided by the patient.   Review of patient's allergies indicates:   Allergen Reactions    Penicillins Rash     Past Medical History:   Diagnosis Date    Anxiety     Hypertension     Peroneal tendon injury     PONV (postoperative nausea and vomiting)      Past Surgical History:   Procedure Laterality Date    CHOLECYSTECTOMY  2018    EXCISION OF LESION OF TENDON Left 1/3/2020    Procedure: EXCISION, LESION, TENDON;  Surgeon: Andrew Archibald MD;  Location: Replaced by [facility];  Service: Orthopedics;  Laterality: Left;  left peroneals - brevis to longus tenodesis with side to side anastamosis    FOOT GANGLION EXCISION Left 1/3/2020    Procedure: EXCISION, GANGLION CYST, FOOT;  Surgeon: Andrew Archibald MD;  Location: [facility] OR;  Service: Orthopedics;  Laterality: Left;    HYSTERECTOMY      LAPAROSCOPIC SALPINGO-OOPHORECTOMY Bilateral 2/18/2019    Procedure: SALPINGO-OOPHORECTOMY, LAPAROSCOPIC;  Surgeon: Carrington Us MD;  Location: [facility];  Service: OB/GYN;  Laterality: Bilateral;    OOPHORECTOMY      plate put in right arm      now removed     Family History   Problem Relation Age of Onset    Hypertension Father     Diabetes Father     Hypertension Mother     Diabetes Mother     Breast cancer Neg Hx     Colon cancer Neg Hx     Cancer Neg Hx     Ovarian cancer Neg Hx      Social History     Tobacco Use    Smoking  status: Never    Smokeless tobacco: Never   Substance Use Topics    Alcohol use: No    Drug use: No     Review of Systems   Constitutional:  Negative for fever.   HENT:  Negative for sore throat.    Respiratory:  Negative for chest tightness and shortness of breath.    Cardiovascular:  Negative for chest pain.   Gastrointestinal:  Negative for abdominal pain and nausea.   Genitourinary:  Negative for dysuria, frequency and urgency.   Musculoskeletal:  Positive for arthralgias (Left thigh). Negative for back pain.   Skin:  Negative for rash.   Neurological:  Negative for dizziness, weakness, light-headedness and numbness.   Hematological:  Does not bruise/bleed easily.     Physical Exam     Initial Vitals [04/15/23 1755]   BP Pulse Resp Temp SpO2   (!) 145/80 100 18 97.4 °F (36.3 °C) 98 %      MAP       --         Physical Exam    Nursing note and vitals reviewed.  Constitutional: She appears well-developed and well-nourished.   HENT:   Head: Normocephalic and atraumatic.   Right Ear: Tympanic membrane, external ear and ear canal normal. Tympanic membrane is not erythematous. No middle ear effusion.   Left Ear: Tympanic membrane, external ear and ear canal normal. Tympanic membrane is not erythematous.  No middle ear effusion.   Nose: Nose normal.   Mouth/Throat: Uvula is midline, oropharynx is clear and moist and mucous membranes are normal. Mucous membranes are not pale and not dry.   Eyes: Conjunctivae and EOM are normal. Pupils are equal, round, and reactive to light.   Neck: Neck supple.   Normal range of motion.  Cardiovascular:  Normal rate, regular rhythm, normal heart sounds and intact distal pulses.           Pulmonary/Chest: Effort normal and breath sounds normal. She has no decreased breath sounds. She has no wheezes. She has no rhonchi. She has no rales.   Abdominal: Abdomen is soft. Bowel sounds are normal. There is no abdominal tenderness.   Musculoskeletal:         General: Normal range of motion.       Cervical back: Normal range of motion and neck supple.     Neurological: She is alert and oriented to person, place, and time. She has normal strength. She displays normal reflexes. No cranial nerve deficit or sensory deficit.   Skin: Skin is warm and dry. Capillary refill takes less than 2 seconds. No rash noted.   Psychiatric: She has a normal mood and affect. Her behavior is normal. Judgment and thought content normal.       ED Course   Procedures  Labs Reviewed - No data to display       Imaging Results              X-Ray Femur Ap/Lat Left (Final result)  Result time 04/15/23 19:15:45      Final result by Krystian Orellana MD (04/15/23 19:15:45)                   Impression:      No acute fracture or dislocation.      Electronically signed by: Krystian Orellana  Date:    04/15/2023  Time:    19:15               Narrative:    EXAMINATION:  XR FEMUR 2 VIEW LEFT    CLINICAL HISTORY:  Other specified disorders of bone, thigh    TECHNIQUE:  AP and lateral views of the left femur were performed.    COMPARISON:  None}    FINDINGS:  No acute fracture, dislocation, or traumatic malalignment.  Joint spaces are maintained.                                       US Lower Extremity Veins Left (Final result)  Result time 04/15/23 19:03:29      Final result by Krystian Orellana MD (04/15/23 19:03:29)                   Impression:      No evidence of deep venous thrombosis in the left lower extremity.      Electronically signed by: Krystian Orellana  Date:    04/15/2023  Time:    19:03               Narrative:    EXAMINATION:  US LOWER EXTREMITY VEINS LEFT    CLINICAL HISTORY:  Localized swelling, mass and lump, left lower limb    TECHNIQUE:  Duplex and color flow Doppler evaluation and graded compression of the left lower extremity veins was performed.    COMPARISON:  None    FINDINGS:  Left thigh veins: The common femoral, femoral, popliteal, upper greater saphenous, and deep femoral veins are patent and free of  thrombus. The veins are normally compressible and have normal phasic flow and augmentation response.    Left calf veins: The visualized calf veins are patent.    Contralateral CFV: The contralateral (right) common femoral vein is patent and free of thrombus.    Miscellaneous: None                                       Medications   ketorolac injection 30 mg (30 mg Intramuscular Given 4/15/23 1806)   orphenadrine injection 60 mg (60 mg Intramuscular Given 4/15/23 1806)     Medical Decision Making:   Differential Diagnosis:   DVT, MS pain, thrombophlebitis, cellulitis   Clinical Tests:   Radiological Study: Ordered and Reviewed  ED Management:  Evaluation of a 58-year-old female with left lateral thigh pain for the past 5 weeks.  Patient presents with continued pain.  Patient has no redness, swelling, or warmth on exam.  She does have superficial veins with no obvious swelling or redness.  DVT ultrasound is negative.  X-ray of left femur shows no soft tissue swelling or any other osseous abnormalities.   Toradol and nor flex given in the ED for pain.  Will discharge home with close follow-up. Patient/caregiver voices understanding and feels comfortable with discharge plan.      The patient acknowledges that close follow up with medical provider is required. Instructed to follow up with PCP within 2 days. Patient was given specific return precautions. The patient agrees to comply with all instruction and directions given in the ER.                          Clinical Impression:   Final diagnoses:  [R22.42] Localized swelling of left lower extremity  [M89.8X5] Pain of left femur  [F41.8] Anxiety about health (Primary)        ED Disposition Condition    Discharge Stable          ED Prescriptions       Medication Sig Dispense Start Date End Date Auth. Provider    cyclobenzaprine (FLEXERIL) 10 MG tablet  (Status: Discontinued) Take 1 tablet (10 mg total) by mouth 3 (three) times daily as needed for Muscle spasms. 15 tablet  4/15/2023 4/15/2023 Chiqui Whitney NP    naproxen (NAPROSYN) 500 MG tablet  (Status: Discontinued) Take 1 tablet (500 mg total) by mouth every 12 (twelve) hours as needed (pain). Take with food. 10 tablet 4/15/2023 4/15/2023 Chiqui Whitney NP    cyclobenzaprine (FLEXERIL) 10 MG tablet Take 1 tablet (10 mg total) by mouth 3 (three) times daily as needed for Muscle spasms. 15 tablet 4/15/2023 4/20/2023 Chiqui Whitney NP    naproxen (NAPROSYN) 500 MG tablet Take 1 tablet (500 mg total) by mouth every 12 (twelve) hours as needed (pain). Take with food. 10 tablet 4/15/2023 -- Chiqui Whitney NP          Follow-up Information       Follow up With Specialties Details Why Contact Info    [facility] Schedule an appointment as soon as possible for a visit in 2 days  [address] [place] [address]  [address]RD FLOOR  [facility]  [place] [address]  [phone]               Chiqui Whitney NP  04/15/23 2053

## 2023-04-15 NOTE — ED TRIAGE NOTES
58 y.o. female presents to ER Room/bed info not found   Chief Complaint   Patient presents with    Leg Pain   .   C/o pain behind left upper leg for five weeks pain getting worse

## 2023-11-15 ENCOUNTER — TELEPHONE (OUTPATIENT)
Dept: OBSTETRICS AND GYNECOLOGY | Facility: CLINIC | Age: 58
End: 2023-11-15
Payer: MEDICAID

## 2023-11-15 NOTE — TELEPHONE ENCOUNTER
----- Message from Wendy Carlin MA sent at 11/14/2023  2:42 PM CST -----  Contact: juancarlos Peres  MRN: [id number]  Home Phone      [phone]  Work Phone      Not on file.  Mobile          [phone]    Patient Care Team:  Chandler Maradiaga PA as PCP - General (General Practice)  Carrington Us MD as Obstetrician (Obstetrics)  OB? No  What phone number can you be reached at? [phone]  Message: Would like to make appt for bump in vaginal area.

## 2023-11-16 ENCOUNTER — PROCEDURE VISIT (OUTPATIENT)
Dept: OBSTETRICS AND GYNECOLOGY | Facility: CLINIC | Age: 58
End: 2023-11-16
Payer: MEDICAID

## 2023-11-16 VITALS
DIASTOLIC BLOOD PRESSURE: 78 MMHG | SYSTOLIC BLOOD PRESSURE: 126 MMHG | HEART RATE: 101 BPM | HEIGHT: 62 IN | BODY MASS INDEX: 33.57 KG/M2 | WEIGHT: 182.44 LBS

## 2023-11-16 DIAGNOSIS — B37.2 SKIN YEAST INFECTION: Primary | ICD-10-CM

## 2023-11-16 DIAGNOSIS — R10.2 VAGINAL PAIN: ICD-10-CM

## 2023-11-16 PROCEDURE — 99213 OFFICE O/P EST LOW 20 MIN: CPT | Mod: S$PBB,,, | Performed by: STUDENT IN AN ORGANIZED HEALTH CARE EDUCATION/TRAINING PROGRAM

## 2023-11-16 PROCEDURE — 99213 PR OFFICE/OUTPT VISIT, EST, LEVL III, 20-29 MIN: ICD-10-PCS | Mod: S$PBB,,, | Performed by: STUDENT IN AN ORGANIZED HEALTH CARE EDUCATION/TRAINING PROGRAM

## 2023-11-16 RX ORDER — TRIAMCINOLONE ACETONIDE 1 MG/G
CREAM TOPICAL 2 TIMES DAILY
Qty: 453.6 G | Refills: 3 | Status: SHIPPED | OUTPATIENT
Start: 2023-11-16

## 2023-11-16 RX ORDER — NYSTATIN 100000 [USP'U]/G
POWDER TOPICAL 2 TIMES DAILY
Qty: 30 G | Refills: 3 | Status: SHIPPED | OUTPATIENT
Start: 2023-11-16

## 2023-11-16 NOTE — PROGRESS NOTES
Subjective:       Patient ID: Yaneth Peres is a 58 y.o. female.    Chief Complaint:  vaginal boil      History of Present Illness  Patient is a 59 yo  presenting due to vaginal issue. She reports that she has an area near her buttocks that she scratched. She is also having having           GYN & OB History  No LMP recorded. Patient has had a hysterectomy.   Date of Last Pap: No result found    OB History    Para Term  AB Living   2 2 2         SAB IAB Ectopic Multiple Live Births                  # Outcome Date GA Lbr Sergio/2nd Weight Sex Delivery Anes PTL Lv   2 Term            1 Term                Review of Systems  Review of Systems   Constitutional:  Negative for chills, fatigue, fever and unexpected weight change.   HENT:  Negative for congestion, sinus pain and sore throat.    Eyes:  Negative for visual disturbance.   Respiratory:  Negative for cough, chest tightness and shortness of breath.    Cardiovascular:  Positive for leg swelling. Negative for chest pain and palpitations.   Gastrointestinal:  Positive for constipation. Negative for abdominal pain, diarrhea, nausea and vomiting.   Genitourinary:  Negative for menstrual problem, vaginal bleeding, vaginal discharge and vaginal pain.   Musculoskeletal:  Negative for myalgias.   Skin:  Negative for color change, pallor and rash.   Neurological:  Negative for dizziness, light-headedness and headaches.   Psychiatric/Behavioral:  Positive for agitation. The patient is not nervous/anxious.            Objective:    Physical Exam:   Constitutional: She is oriented to person, place, and time. She appears well-developed and well-nourished. No distress.    HENT:   Head: Normocephalic and atraumatic.    Eyes: Pupils are equal, round, and reactive to light. EOM are normal.     Cardiovascular:  Normal rate.             Pulmonary/Chest: Effort normal. No respiratory distress.          Genitourinary:          Pelvic exam was performed with  patient in the lithotomy position.                 Neurological: She is alert and oriented to person, place, and time.    Skin: Skin is warm and dry. No erythema.    Psychiatric: She has a normal mood and affect. Her behavior is normal. Judgment and thought content normal.          Assessment:        1. Skin yeast infection    2. Vaginal pain                Plan:      Yaneth was seen today for vaginal boil.    Diagnoses and all orders for this visit:    Skin yeast infection  -     triamcinolone acetonide 0.1% (KENALOG) 0.1 % cream; Apply topically 2 (two) times daily.  -     nystatin (MYCOSTATIN) powder; Apply topically 2 (two) times daily.    Vaginal pain

## 2023-11-16 NOTE — TELEPHONE ENCOUNTER
----- Message from Wendy Carlin MA sent at 11/16/2023 12:56 PM CST -----  [facility]  Yaneth Peres  MRN: [id number]  Home Phone      [phone]  Work Phone      Not on file.  Mobile          [phone]    Patient Care Team:  Chandler Maradiaga PA as PCP - General (General Practice)  Carrington Us MD as Obstetrician (Obstetrics)  Monserrat Morfin MD as Consulting Physician (Obstetrics and Gynecology)  OB? No  What phone number can you be reached at? [phone]  Message: Has questions regarding Rx's that were called in.

## 2023-12-06 ENCOUNTER — HOSPITAL ENCOUNTER (OUTPATIENT)
Dept: RADIOLOGY | Facility: HOSPITAL | Age: 58
Discharge: HOME | End: 2023-12-06
Attending: PHYSICIAN ASSISTANT
Payer: MEDICAID

## 2023-12-06 VITALS — BODY MASS INDEX: 33.49 KG/M2 | WEIGHT: 182 LBS | HEIGHT: 62 IN

## 2023-12-06 DIAGNOSIS — Z12.31 ENCOUNTER FOR SCREENING MAMMOGRAM FOR BREAST CANCER: ICD-10-CM

## 2023-12-06 PROCEDURE — 77067 SCR MAMMO BI INCL CAD: CPT | Mod: TC

## 2023-12-06 PROCEDURE — 77063 MAMMO DIGITAL SCREENING BILAT WITH TOMO: ICD-10-PCS | Mod: 26,,, | Performed by: RADIOLOGY

## 2023-12-06 PROCEDURE — 77063 BREAST TOMOSYNTHESIS BI: CPT | Mod: 26,,, | Performed by: RADIOLOGY

## 2023-12-06 PROCEDURE — 77067 SCR MAMMO BI INCL CAD: CPT | Mod: 26,,, | Performed by: RADIOLOGY

## 2023-12-06 PROCEDURE — 77067 MAMMO DIGITAL SCREENING BILAT WITH TOMO: ICD-10-PCS | Mod: 26,,, | Performed by: RADIOLOGY

## 2024-08-22 ENCOUNTER — HOSPITAL ENCOUNTER (EMERGENCY)
Facility: HOSPITAL | Age: 59
Discharge: HOME | End: 2024-08-22
Attending: EMERGENCY MEDICINE
Payer: MEDICAID

## 2024-08-22 VITALS
RESPIRATION RATE: 20 BRPM | OXYGEN SATURATION: 98 % | WEIGHT: 180.31 LBS | HEART RATE: 73 BPM | BODY MASS INDEX: 32.98 KG/M2 | SYSTOLIC BLOOD PRESSURE: 176 MMHG | DIASTOLIC BLOOD PRESSURE: 80 MMHG | TEMPERATURE: 98 F

## 2024-08-22 DIAGNOSIS — M79.605 LEFT LEG PAIN: Primary | ICD-10-CM
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
## 2024-08-22 PROCEDURE — 99283 EMERGENCY DEPT VISIT LOW MDM: CPT | Mod: 25

## 2024-08-22 RX ORDER — HYDROCODONE BITARTRATE AND ACETAMINOPHEN 5; 325 MG/1; MG/1
1 TABLET ORAL EVERY 6 HOURS PRN
Qty: 12 TABLET | Refills: 0 | Status: SHIPPED | OUTPATIENT
Start: 2024-08-22

## 2024-08-22 NOTE — Clinical Note
"Yaneth"Yvonne Tejada was seen and treated in our emergency department on 8/22/2024.  She may return to work on 08/26/2024.       If you have any questions or concerns, please don't hesitate to call.      Mary Jane Paulino MD"

## 2024-08-22 NOTE — ED PROVIDER NOTES
Encounter Date: 8/22/2024       History     Chief Complaint   Patient presents with    Leg Pain     Pt arrives to the ed with diane left leg and knee pain after a slip and fall down 4 steps. Scab to left knee noted.     Chief complaint:  Left leg pain   59-year-old female with a history of anxiety hypertension for any tendon injury presents to be evaluated for left leg pain after she slipped going down 3 stairs.  She reports she landed on her buttocks in the side of her left leg.  No visible wounds or ecchymosis noted.  She has been ambulatory since.  She currently states she has 10/10 deep aching pain no exacerbating or alleviating factors      Review of patient's allergies indicates:   Allergen Reactions    Penicillins Rash     Past Medical History:   Diagnosis Date    Anxiety     Hypertension     Peroneal tendon injury     PONV (postoperative nausea and vomiting)      Past Surgical History:   Procedure Laterality Date    CHOLECYSTECTOMY  2018    EXCISION OF LESION OF TENDON Left 1/3/2020    Procedure: EXCISION, LESION, TENDON;  Surgeon: Andrew Archibald MD;  Location: [facility];  Service: Orthopedics;  Laterality: Left;  left peroneals - brevis to longus tenodesis with side to side anastamosis    FOOT GANGLION EXCISION Left 1/3/2020    Procedure: EXCISION, GANGLION CYST, FOOT;  Surgeon: Andrew Archibald MD;  Location: [facility] OR;  Service: Orthopedics;  Laterality: Left;    HYSTERECTOMY      LAPAROSCOPIC SALPINGO-OOPHORECTOMY Bilateral 2/18/2019    Procedure: SALPINGO-OOPHORECTOMY, LAPAROSCOPIC;  Surgeon: Carrington Us MD;  Location: [facility];  Service: OB/GYN;  Laterality: Bilateral;    OOPHORECTOMY      plate put in right arm      now removed     Family History   Problem Relation Name Age of Onset    Hypertension Father      Diabetes Father      Hypertension Mother      Diabetes Mother      Breast cancer Neg Hx      Colon cancer Neg Hx      Cancer Neg Hx      Ovarian cancer Neg Hx       Social History     Tobacco  Use    Smoking status: Never    Smokeless tobacco: Never   Substance Use Topics    Alcohol use: No    Drug use: No     Review of Systems   Musculoskeletal:  Positive for arthralgias and myalgias.   Skin:  Negative for color change and wound.   Neurological:  Negative for weakness and numbness.       Physical Exam     Initial Vitals [08/22/24 1219]   BP Pulse Resp Temp SpO2   (!) 176/80 73 20 98 °F (36.7 °C) 98 %      MAP       --         Physical Exam    Nursing note and vitals reviewed.  Constitutional: She appears well-developed and well-nourished.   HENT:   Head: Normocephalic and atraumatic.   Cardiovascular:  Normal rate.           Pulmonary/Chest: Breath sounds normal. She has no wheezes. She has no rhonchi. She has no rales. She exhibits no tenderness.   Musculoskeletal:         General: Tenderness present. No edema. Normal range of motion.     Neurological: She is alert and oriented to person, place, and time. GCS score is 15. GCS eye subscore is 4. GCS verbal subscore is 5. GCS motor subscore is 6.   Psychiatric: She has a normal mood and affect. Thought content normal.         ED Course   Procedures  Labs Reviewed - No data to display       Imaging Results              X-Ray Femur Ap/Lat Left (Final result)  Result time 08/22/24 13:12:53      Final result by Jacqueline Carl MD (08/22/24 13:12:53)                   Impression:      No acute fracture of the femur with mild degenerative changes of the left hip and moderate medial joint space narrowing of the knee      Electronically signed by: Jacqueline Carl MD  Date:    08/22/2024  Time:    13:12               Narrative:    EXAMINATION:  XR FEMUR 2 VIEW LEFT    CLINICAL HISTORY:  Unspecified fall, initial encounter    COMPARISON:  None.    FINDINGS:  Moderate medial joint space narrowing.  Left iliac stent in place.  Mild degenerative change of the left hip.  The femur is intact with no acute fracture.                                       Medications - No  data to display  Medical Decision Making  59 year old female with left leg pain after falling down 4-5 steps. She has been ambulatory since  Differential diagnoses include fracture, strain, hematoma, dislocation    Amount and/or Complexity of Data Reviewed  Radiology: ordered.    Risk  Prescription drug management.  Risk Details: Patient with tenderness to the lateral aspect of the left femur no shortening or rotation of the extremity   Patient is ambulatory   Negative imaging today   Likely experiencing pain from soft tissue injury   Stable for DC with follow-up with orthopedics given return precautions                                      Clinical Impression:  Final diagnoses:  [W19.XXXA] Fall  [M79.605] Left leg pain (Primary)          ED Disposition Condition    Discharge Stable          ED Prescriptions       Medication Sig Dispense Start Date End Date Auth. Provider    HYDROcodone-acetaminophen (NORCO) 5-325 mg per tablet Take 1 tablet by mouth every 6 (six) hours as needed for Pain. 12 tablet 8/22/2024 -- Marilee Wiseman NP          Follow-up Information       Follow up With Specialties Details Why Contact Info    Chandler Maradiaga PA General Practice Call in 3 days  [address]  [address]RD FLOOR  [facility]  [place] [address]  [phone]               Marilee Wiseman NP  08/22/24 3429

## 2024-12-30 ENCOUNTER — TELEPHONE (OUTPATIENT)
Dept: OBSTETRICS AND GYNECOLOGY | Facility: CLINIC | Age: 59
End: 2024-12-30
Payer: MEDICAID

## 2024-12-30 ENCOUNTER — HOSPITAL ENCOUNTER (OUTPATIENT)
Dept: RADIOLOGY | Facility: HOSPITAL | Age: 59
Discharge: HOME | End: 2024-12-30
Attending: STUDENT IN AN ORGANIZED HEALTH CARE EDUCATION/TRAINING PROGRAM
Payer: MEDICAID

## 2024-12-30 VITALS — WEIGHT: 180 LBS | HEIGHT: 62 IN | BODY MASS INDEX: 33.13 KG/M2

## 2024-12-30 DIAGNOSIS — Z12.31 BREAST CANCER SCREENING BY MAMMOGRAM: Primary | ICD-10-CM

## 2024-12-30 DIAGNOSIS — Z12.31 BREAST CANCER SCREENING BY MAMMOGRAM: ICD-10-CM

## 2024-12-30 PROCEDURE — 77067 SCR MAMMO BI INCL CAD: CPT | Mod: 26,,, | Performed by: RADIOLOGY

## 2024-12-30 PROCEDURE — 77063 BREAST TOMOSYNTHESIS BI: CPT | Mod: 26,,, | Performed by: RADIOLOGY

## 2024-12-30 PROCEDURE — 77063 BREAST TOMOSYNTHESIS BI: CPT | Mod: TC

## 2024-12-30 NOTE — TELEPHONE ENCOUNTER
----- Message from Yasmin sent at 12/30/2024  9:33 AM CST -----  Contact: Scott Tejada  MRN: [id number]  Home Phone      [phone]  Work Phone      Not on file.  Mobile          [phone]    Patient Care Team:  Chandler Maradiaga PA as PCP - General (General Practice)  Carrington Us MD as Obstetrician (Obstetrics)  Monserrat Morfin MD as Consulting Physician (Obstetrics and Gynecology)   OB? No  What phone number can you be reached at? [phone]  Message: please link mammo orders for appt on 12/30

## 2025-01-03 ENCOUNTER — HOSPITAL ENCOUNTER (EMERGENCY)
Facility: HOSPITAL | Age: 60
Discharge: HOME | End: 2025-01-03
Payer: MEDICAID

## 2025-01-03 VITALS
HEIGHT: 62 IN | RESPIRATION RATE: 16 BRPM | WEIGHT: 183 LBS | BODY MASS INDEX: 33.68 KG/M2 | TEMPERATURE: 98 F | DIASTOLIC BLOOD PRESSURE: 94 MMHG | HEART RATE: 78 BPM | SYSTOLIC BLOOD PRESSURE: 127 MMHG | OXYGEN SATURATION: 97 %

## 2025-01-03 DIAGNOSIS — M79.642 LEFT HAND PAIN: ICD-10-CM

## 2025-01-03 DIAGNOSIS — S70.02XA CONTUSION OF LEFT HIP, INITIAL ENCOUNTER: ICD-10-CM

## 2025-01-03 DIAGNOSIS — M25.552 LEFT HIP PAIN: ICD-10-CM
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
## 2025-01-03 PROCEDURE — 99284 EMERGENCY DEPT VISIT MOD MDM: CPT | Mod: 25

## 2025-01-03 PROCEDURE — 96372 THER/PROPH/DIAG INJ SC/IM: CPT | Performed by: NURSE PRACTITIONER

## 2025-01-03 PROCEDURE — 63600175 PHARM REV CODE 636 W HCPCS: Performed by: NURSE PRACTITIONER

## 2025-01-03 RX ORDER — TIZANIDINE 4 MG/1
4 TABLET ORAL EVERY 8 HOURS PRN
Qty: 12 TABLET | Refills: 0 | Status: SHIPPED | OUTPATIENT
Start: 2025-01-03

## 2025-01-03 RX ORDER — KETOROLAC TROMETHAMINE 30 MG/ML
15 INJECTION, SOLUTION INTRAMUSCULAR; INTRAVENOUS
Status: COMPLETED | OUTPATIENT
Start: 2025-01-03 | End: 2025-01-03

## 2025-01-03 RX ORDER — DICLOFENAC SODIUM 75 MG/1
75 TABLET, DELAYED RELEASE ORAL 2 TIMES DAILY PRN
Qty: 20 TABLET | Refills: 0 | Status: SHIPPED | OUTPATIENT
Start: 2025-01-03

## 2025-01-03 RX ADMIN — KETOROLAC TROMETHAMINE 15 MG: 30 INJECTION, SOLUTION INTRAMUSCULAR at 02:01

## 2025-01-03 NOTE — ED PROVIDER NOTES
Encounter Date: 1/3/2025       History     Chief Complaint   Patient presents with    Fall     Pt to ED complaining of left hip pain after falling off of the third step while at home yesterday afternoon.     Yaneth Tejada is a 59 y.o. female with PMH of anxiety, hypertension presenting to the ED for evaluation of left hand, lower back, and hip pain after fall.  Patient reports accidental trip and fall yesterday when she lost her footing while walking up steps. She denies head trauma or LOC.  She reports landing on her L hip and trying to catch her fall with her L hand. She has had a throbbing pain in the joints of her L hand 3rd finger and L hip. Pain radiates into L leg. No numbness/tingling or saddle anesthesia. She currently rates pain 6/10 in severity.     The history is provided by the patient.     Review of patient's allergies indicates:   Allergen Reactions    Penicillins Rash     Past Medical History:   Diagnosis Date    Anxiety     Hypertension     Peroneal tendon injury     PONV (postoperative nausea and vomiting)      Past Surgical History:   Procedure Laterality Date    CHOLECYSTECTOMY 2018    EXCISION OF LESION OF TENDON Left 1/3/2020    Procedure: EXCISION, LESION, TENDON;  Surgeon: Andrew Archibald MD;  Location: [facility];  Service: Orthopedics;  Laterality: Left;  left peroneals - brevis to longus tenodesis with side to side anastamosis    FOOT GANGLION EXCISION Left 1/3/2020    Procedure: EXCISION, GANGLION CYST, FOOT;  Surgeon: Andrew Archibald MD;  Location: [facility];  Service: Orthopedics;  Laterality: Left;    HYSTERECTOMY      LAPAROSCOPIC SALPINGO-OOPHORECTOMY Bilateral 2/18/2019    Procedure: SALPINGO-OOPHORECTOMY, LAPAROSCOPIC;  Surgeon: Carrington Us MD;  Location: [facility];  Service: OB/GYN;  Laterality: Bilateral;    OOPHORECTOMY      plate put in right arm      now removed     Family History   Problem Relation Name Age of Onset    Hypertension Father      Diabetes Father      Hypertension  Mother      Diabetes Mother      Breast cancer Neg Hx      Colon cancer Neg Hx      Cancer Neg Hx      Ovarian cancer Neg Hx       Social History     Tobacco Use    Smoking status: Never    Smokeless tobacco: Never   Substance Use Topics    Alcohol use: No    Drug use: No     Review of Systems   Constitutional:  Negative for activity change, chills and fever.   HENT:  Negative for congestion, ear discharge, ear pain, postnasal drip, sinus pressure, sinus pain and sore throat.    Respiratory:  Negative for cough, chest tightness and shortness of breath.    Cardiovascular:  Negative for chest pain.   Gastrointestinal:  Negative for abdominal distention, abdominal pain and nausea.   Genitourinary:  Negative for dysuria, frequency and urgency.   Musculoskeletal:  Positive for arthralgias (L hip, hand) and back pain.   Skin:  Negative for rash.   Neurological:  Negative for dizziness, weakness, light-headedness and numbness.   Hematological:  Does not bruise/bleed easily.       Physical Exam     Initial Vitals [01/03/25 1256]   BP Pulse Resp Temp SpO2   (!) 146/78 84 16 97.9 °F (36.6 °C) 97 %      MAP       --         Physical Exam    Nursing note and vitals reviewed.  Constitutional: She appears well-developed and well-nourished.   HENT:   Head: Normocephalic and atraumatic.   Eyes: Conjunctivae and EOM are normal. Pupils are equal, round, and reactive to light.   Neck: Neck supple.   Normal range of motion.  Cardiovascular:  Normal rate, regular rhythm, normal heart sounds and intact distal pulses.           Pulmonary/Chest: Breath sounds normal.   Abdominal: Abdomen is soft. Bowel sounds are normal.   Musculoskeletal:      Cervical back: Normal range of motion and neck supple.      Left hip: Tenderness present. Decreased range of motion.     Neurological: She is alert and oriented to person, place, and time. She has normal strength.   Skin: Skin is warm and dry. Capillary refill takes less than 2 seconds.    Psychiatric: She has a normal mood and affect. Her behavior is normal. Judgment and thought content normal.         ED Course   Procedures  Labs Reviewed - No data to display       Imaging Results              X-Ray Hip 2 or 3 views Left with Pelvis when performed (Final result)  Result time 01/03/25 14:05:25      Final result by Sebastián Gonzalez MD (01/03/25 14:05:25)                   Impression:      No acute displaced fractures.      Electronically signed by: Sebastián Gonzalez MD  Date:    01/03/2025  Time:    14:05               Narrative:    EXAMINATION:  XR HIP WITH PELVIS WHEN PERFORMED 2 OR 3 VIEWS LEFT    CLINICAL HISTORY:  Pain in left hip    TECHNIQUE:  AP view of the pelvis and frog leg lateral view of the left hip were performed.    COMPARISON:  None    FINDINGS:  Osseous mineralization is preserved.  No acute displaced fractures.  No suspicious lytic or blastic lesions.  Mild bilateral femoroacetabular osteoarthritis.  No subluxation or dislocation.  SI joints are symmetric.  Pubic symphysis appears within normal limits.  Vascular stents project over the lower abdomen and pelvis.  Multiple pelvic phleboliths.                                       X-Ray Lumbar Spine Ap And Lateral (Final result)  Result time 01/03/25 14:01:03      Final result by Sebastián Gonzalez MD (01/03/25 14:01:03)                   Impression:      No acute radiographic abnormalities of the lumbar spine.      Electronically signed by: Sebastián Gonzalez MD  Date:    01/03/2025  Time:    14:01               Narrative:    EXAMINATION:  XR LUMBAR SPINE AP AND LATERAL    TECHNIQUE:  AP, lateral and spot images were performed of the lumbar spine.    COMPARISON:  Radiograph 06/06/2023.  MRI 06/06/2023    FINDINGS:  Grade 1 anterolisthesis of L4 on L5.  No spondylolysis.  Vertebral body heights are well maintained.  No suspicious lytic or blastic lesions.  Mild degenerative disc space narrowing at L4-L5 and L5-S1.  Degenerative facet  arthropathy most pronounced at L4-L5.  SI joints are symmetric.  Femoroacetabular cartilage spaces are preserved.  Vascular stents project over the lower abdomen/pelvis.  Right upper quadrant cholecystectomy clips.  Atherosclerosis.  Lung bases are clear.                                       X-Ray Hand 3 view Left (Final result)  Result time 01/03/25 13:54:42      Final result by Sebastián Gonazlez MD (01/03/25 13:54:42)                   Impression:      No acute displaced fracture.      Electronically signed by: Sebastián Gonzalez MD  Date:    01/03/2025  Time:    13:54               Narrative:    EXAMINATION:  XR HAND COMPLETE 3 VIEW LEFT    CLINICAL HISTORY:  left hand pain;.    COMPARISON:  None.    TECHNIQUE:  PA, lateral and oblique views of the left hand were submitted for evaluation.    FINDINGS:  Osseous mineralization is preserved.  No acute displaced fractures.  No suspicious lytic or blastic lesions.  Scattered DJD.  Soft tissues appear within normal limits.                                       Medications   ketorolac injection 15 mg (15 mg Intramuscular Given 1/3/25 1437)     Medical Decision Making  Evaluation of a 58 yo female presenting with lower back, left hip, and left hand pain after accidental fall.  Presents nontoxic appearing with stable vital signs.  No spinous process tenderness  + left hip tenderness with no obvious swelling or deformities with good distal pulses  + left 3rd PIP joint tenderness with no associated redness or swelling    Differential diagnosis includes contusion, fracture, sprain, strain, osteoarthritis    Amount and/or Complexity of Data Reviewed  Radiology: ordered. Decision-making details documented in ED Course.    Risk  Prescription drug management.  Risk Details: Stable for discharge home.  Negative imaging of the lumbar spine, left hip, and left hand.  Pain control with close outpatient follow-up with PCP. Patient/caregiver voices understanding and feels comfortable  with discharge plan.      The patient acknowledges that close follow up with medical provider is required. Instructed to follow up with PCP within 2 days. Patient was given specific return precautions. The patient agrees to comply with all instruction and directions given in the ER.                                        Clinical Impression:  Final diagnoses:  [M25.552] Left hip pain  [W19.XXXA] Fall, initial encounter (Primary)  [S70.02XA] Contusion of left hip, initial encounter  [M79.642] Left hand pain          ED Disposition Condition    Discharge Stable          ED Prescriptions       Medication Sig Dispense Start Date End Date Auth. Provider    tiZANidine (ZANAFLEX) 4 MG tablet Take 1 tablet (4 mg total) by mouth every 8 (eight) hours as needed (pain). 12 tablet 1/3/2025 -- Chiqui Whitney NP    diclofenac (VOLTAREN) 75 MG EC tablet Take 1 tablet (75 mg total) by mouth 2 (two) times daily as needed (pain). 20 tablet 1/3/2025 -- Chiqui Whitney NP          Follow-up Information       Follow up With Specialties Details Why Contact Info    Chandler Maradiaga, PA General Practice, Family Medicine Schedule an appointment as soon as possible for a visit in 2 days  [address] [place] [address]  3RD FLOOR  [facility]  [place] [address]  [phone]               Chiqui Whitney NP  01/03/25 1100

## (undated) DEVICE — PACK LAPAROSCOPY

## (undated) DEVICE — SEE MEDLINE ITEM 157117

## (undated) DEVICE — BAG TISS RETRV MONARCH 10MM

## (undated) DEVICE — FORCEP DISSECTING LYONS PKS

## (undated) DEVICE — NDL INSUF ULTRA VERESS 120MM

## (undated) DEVICE — TROCAR KII FIOS ZTHREAD 11X100

## (undated) DEVICE — TRAY URETHRAL CATH 14FR KC3410

## (undated) DEVICE — TROCAR ENDO Z THREAD KII 5X100

## (undated) DEVICE — GOWN SMART IMP BREATHABLE XXLG

## (undated) DEVICE — ADHESIVE DERMABOND ADVANCED

## (undated) DEVICE — SUTURE VICRYL PLUS 3-0 PS1 27

## (undated) DEVICE — CHLORAPREP W TINT 26ML APPL

## (undated) DEVICE — GLOVE PROTEXIS LTX  8.5

## (undated) DEVICE — TROCAR LAPSCP KII SZ 11 10CM

## (undated) DEVICE — DISSECTOR SONICISION CRV 39CM

## (undated) DEVICE — IRRIGATOR ENDOSCOPY DISP.

## (undated) DEVICE — SOL CLEARIFY VISUALIZATION LAP

## (undated) DEVICE — SCRUB HIBICLENS 4% CHG 4OZ

## (undated) DEVICE — PAD SANITARY OB STERILE